# Patient Record
Sex: FEMALE | Race: WHITE | URBAN - METROPOLITAN AREA
[De-identification: names, ages, dates, MRNs, and addresses within clinical notes are randomized per-mention and may not be internally consistent; named-entity substitution may affect disease eponyms.]

---

## 2023-09-27 ENCOUNTER — TELEPHONE (OUTPATIENT)
Dept: ORTHOPEDIC SURGERY | Age: 43
End: 2023-09-27

## 2023-09-27 NOTE — TELEPHONE ENCOUNTER
General Question     Subject: PATIENT CALL AND SHE WOULD LIKE FOR THE OFFICE TO GIVE HER A CALL SHE HAS SOME QUESTION BEFORE SHE COME TO HER APPT THAT IS ON 10/26/23 SHE WILL BE A NEW PATIENT BUT SHE IS [place] FROM [place] AND SHE JUST HAVE SOME QUESTIONS THAT NEEDS TO BE ANSWERED. PLEASE ADVISE.   Patient Eliel [facility] Number: [phone]

## 2023-09-28 NOTE — TELEPHONE ENCOUNTER
OTHER    PT CALLED TO SPEAK WITH CLINICAL IN REGARD TO HAVE GENERAL QUESTIONS TO SPEAK WITH TEAM.     PT IS COMING FROM [place] AND LOOKING TO HAVE QUESTIONS ANSWERED PRIOR TO COMING INTO TOWN.      PLEASE ADVISE

## 2023-09-29 NOTE — TELEPHONE ENCOUNTER
Patient will be called by end of today. Unable to call due to being in busy clinic for the past few days.  Her appt isnt until mid October

## 2023-09-29 NOTE — TELEPHONE ENCOUNTER
General Question     Subject: PT HAS QUESTIONS REGARDING ARTHROFIBROSIS AND IF A CASE LIKE HERS CAN BE TREATED. Patient and /or Facility Request: [facility] Number: [phone]      PLEASE RETURN THE PT'S PHONE CALL. 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 I TOLD HER THAT DR SANTILLAN IS OUT OF THE OFFICE, BUT SHE WOULD STILL LIKE TO SPEAK WITH AN ASSISTANT OR A NURSE. I CALLED THE PT BACK TO LET HER KNOW THE OFFICE DID SEE HER MESSAGE AND WILL BE CALLING HER BACK AT THE END OF THE DAY.

## 2023-10-23 SDOH — HEALTH STABILITY: PHYSICAL HEALTH: ON AVERAGE, HOW MANY MINUTES DO YOU ENGAGE IN EXERCISE AT THIS LEVEL?: 10 MIN

## 2023-10-23 SDOH — HEALTH STABILITY: PHYSICAL HEALTH: ON AVERAGE, HOW MANY DAYS PER WEEK DO YOU ENGAGE IN MODERATE TO STRENUOUS EXERCISE (LIKE A BRISK WALK)?: 0 DAYS

## 2023-10-23 ASSESSMENT — SOCIAL DETERMINANTS OF HEALTH (SDOH)
WITHIN THE LAST YEAR, HAVE YOU BEEN KICKED, HIT, SLAPPED, OR OTHERWISE PHYSICALLY HURT BY YOUR PARTNER OR EX-PARTNER?: NO
WITHIN THE LAST YEAR, HAVE YOU BEEN AFRAID OF YOUR PARTNER OR EX-PARTNER?: NO
WITHIN THE LAST YEAR, HAVE TO BEEN RAPED OR FORCED TO HAVE ANY KIND OF SEXUAL ACTIVITY BY YOUR PARTNER OR EX-PARTNER?: NO
WITHIN THE LAST YEAR, HAVE YOU BEEN HUMILIATED OR EMOTIONALLY ABUSED IN OTHER WAYS BY YOUR PARTNER OR EX-PARTNER?: NO

## 2023-10-26 ENCOUNTER — HOSPITAL ENCOUNTER (OUTPATIENT)
Dept: PHYSICAL THERAPY | Age: 43
Discharge: HOME | End: 2023-10-26

## 2023-10-26 ENCOUNTER — OFFICE VISIT (OUTPATIENT)
Dept: ORTHOPEDIC SURGERY | Age: 43
End: 2023-10-26
Payer: COMMERCIAL

## 2023-10-26 DIAGNOSIS — R29.898 DECREASED STRENGTH OF LOWER EXTREMITY: ICD-10-CM

## 2023-10-26 DIAGNOSIS — M24.661 ARTHROFIBROSIS OF KNEE JOINT, RIGHT: ICD-10-CM

## 2023-10-26 DIAGNOSIS — R26.9 GAIT ABNORMALITY: ICD-10-CM

## 2023-10-26 DIAGNOSIS — M24.661 ARTHROFIBROSIS OF KNEE JOINT, RIGHT: Primary | ICD-10-CM

## 2023-10-26 DIAGNOSIS — M25.561 RIGHT KNEE PAIN, UNSPECIFIED CHRONICITY: Primary | ICD-10-CM

## 2023-10-26 DIAGNOSIS — M25.469 EDEMA OF KNEE: ICD-10-CM

## 2023-10-26 PROCEDURE — 99205 OFFICE O/P NEW HI 60 MIN: CPT | Performed by: ORTHOPAEDIC SURGERY

## 2023-10-26 NOTE — FLOWSHEET NOTE
(68547)    [] Other: Total Timed Code Tx Minutes 50'        Total Treatment Minutes 4:32-5:58  80'        Charge Justification:  (69320) THERAPEUTIC EXERCISE - Provided verbal/tactile cueing for activities related to strengthening, flexibility, endurance, ROM performed to prevent loss of range of motion, maintain or improve muscular strength or increase flexibility, following either an injury or surgery. (02007) NEUROMUSCULAR RE-EDUCATION- Therapeutic procedure, 1 or more areas, each 15 minutes; neuromuscular reeducation of movement, balance, coordination, kinesthetic sense, posture, and/or proprioception for sitting and/or standing activities  (92332) [facility]- Reviewed/Progressed HEP activities related to neuromuscular reeducation of movement, balance, coordination, kinesthetic sense, posture, and/or proprioception for sitting and/or standing activities      TREATMENT PLAN   Plan: POC Initiated today- see adenike for details  Pt lives in [place]. She is scheduled for surgical intervention in January. Pt to return to PT for pre-op assessment. Electronically Signed by Krishan Tabor PT              Date: 10/26/2023     Note: If patient does not return for scheduled/recommended follow up visits, this note will serve as a discharge from care along with the most recent update on progress.

## 2023-10-26 NOTE — PLAN OF CARE
observation. Intake form has been scanned into medical record. Patient has been instructed to contact their primary care physician regarding ROS issues if not already being addressed at this time. [x] Patient history, allergies, meds reviewed. Medical chart reviewed. See intake form.      Co-morbidities/Complexities (which will affect course of rehabilitation):  []None        Arthritic conditions  [x] Rheumatoid arthritis (M05.9)  [x] Osteoarthritis (M19.91)  [] Gout        Neurological conditions  [] Prior Stroke (I69.30)  [] Parkinson's (G20)  [] Encephalopathy (G93.40)  [] Multiple Sclerosis (G35)  [] Post-polio (G14)  [] Spinal cord injury (SCI)  [] Traumatic brain injury (TBI)  [] ALS    Gastrointestinal conditions   [] Constipation (K59.00)  [] Chron's/ulcerative colitis    Endocrine conditions   [] Hypothyroid (E03.9)  [] Hyperthyroid [] Hx of COVID    Musculoskeletal conditions  [] Disc pathology   [] Congenital spine pathologies   [] Osteoporosis (M81.8)  [] Osteopenia (M85.8)  [] Scoliosis    Cardio/Pulmonary conditions  [] Asthma (J45)  [] Coughing   [] COPD (J44.9)  [] CHF  [] A-fib          Rheumatological conditions  [] Fibromyalgia (M79.7)  [] Lupus  [] Sjogrens  [] Ankylosing spondylitis  [] Other autoimmune       Metabolic conditions  [] Morbid obesity (E66.01)  [] Diabetes type 1(E10.65) or 2 (E11.65)   [] Neuropathy (G60.9)        Cardiovascular conditions  [] Hypertension (I10)  [] Hyperlipidemia (E78.5)  [] Angina pectoris (I20)  [] Atherosclerosis (I70)  [] Pacemaker/Defib  [] Hx of CABG/stent/cardiac surgeries        Developmental Disorders  [] Autism (F84.0)  [] Cerebral Palsy (G80)  [] Down Syndrome (Q90.9)  [] Developmental delay     Psychological Disorders  [] Anxiety (F41.9)  [] Depression (F32.9)   [] Bipolar  [] Other:      Prior surgeries  [x] Involved limb  [] Previous spinal surgery  []  section birth  [] Hysterectomy  [] Bowel / bladder surgery  [] Other relevant

## 2023-10-27 NOTE — PROGRESS NOTES
Date:  10/26/2023    Name:  Scott Reese  Address:  [address] [place] [id number]    :  1980      Age:   39 y.o. Chief Complaint    Knee Pain (NP right knee, arthrofibrosis)      History of Present Illness:  Scott Reese is a 39 y.o. female who presents for evaluation of right knee pain due to right knee arthrofibrosis. Patient reports that her problem stems from a right knee meniscus repair in  performed in [place]. She states she began losing range of motion at the time along with cartilage damage so she underwent a right total knee arthroplasty in 2021. Her range of motion continues to decrease where she was only able to achieve 70 degrees at her best.  As she continued to have pain and dysfunction from this she went on to receive a revision total knee arthroplasty in 2022. She is here today to discuss treatment options and to hear about arthrofibrosis program.  Of note, patient reports that she has a history of rheumatoid arthritis and it is believed by her rheumatologist that this is the cause of her arthrofibrosis. She is unsure if she believes she is still in the inflammatory phase given the amount of medications she is on. Patient reports that she is taking Symponi and naltrexone for medication and reports that she is also taking methotrexate in the past.  She takes that prior to her revision total knee arthroplasty and she was ordered to stop taking her methotrexate for 2 weeks prior to surgery and for 6 weeks following the surgery. 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 The patient denies any new injury. The patient denies any catching, giving way, joint locking, numbness, paresthesias, or weakness. No past medical history on file. No past surgical history on file. No family history on file.     Social History
patient in regards to evaluation, preassessment review, treatment plan and education, and documentation during the visit. .     Dr. Ailyn Kingsley also reviewed this patient's history, assisted in obtaining history from the patient, conducting a physical exam, reviewed imaging, provided patient education and further coordinating care. Yesika Francisco MD  8:41 AM  10/27/2023      This dictation was performed with a verbal recognition program [facility]) and it was checked for errors. It is possible that there are still dictated errors within this office note. If so, please bring any errors to my attention for an addendum. All efforts were made to ensure that this office note is accurate.

## 2023-11-07 ENCOUNTER — TELEPHONE (OUTPATIENT)
Dept: ORTHOPEDIC SURGERY | Age: 43
End: 2023-11-07

## 2023-11-07 NOTE — TELEPHONE ENCOUNTER
I was able to speak with the patient's rheumatologist today Dr. Fabián Segura at [phone]. I reviewed the patient's case with him and stated that we are treating her for arthrofibrosis. We are planning on doing surgery after which we sometimes have to use biologic medications to decrease the development of scar tissue. These may range from methotrexate to anakinra. On her current treatment plan Dr. Tereza Taylor believes that it would be okay if we use these medications. The patient recently had a Simponi infusion for her rheumatoid arthritis. We have to make sure that she does not have another infusion immediately prior to surgery. I spoke with the patient and reviewed the conversation I had with her rheumatologist.  All her questions were answered. I was informed by Dr. Carol Ann Rios that a good fax number to send medical records to maintain good communication and quality of care would be at [phone].           Maylin Figueroa PA-C    Physician Assistant - Certified  [id number] [place]    11/07/23 1:21 PM

## 2023-11-20 DIAGNOSIS — M24.661 ARTHROFIBROSIS OF KNEE JOINT, RIGHT: Primary | ICD-10-CM

## 2023-12-21 ENCOUNTER — TELEPHONE (OUTPATIENT)
Dept: ORTHOPEDIC SURGERY | Age: 43
End: 2023-12-21

## 2023-12-27 DIAGNOSIS — M24.661 ARTHROFIBROSIS OF KNEE JOINT, RIGHT: Primary | ICD-10-CM

## 2023-12-28 NOTE — TELEPHONE ENCOUNTER
Auth: [employer]  Date: 01/10/24  Reference # [id number]  Spoke with: Yaima  Type of SX: Outpatient  Location: [facility]  CPT: 71058, [id number], Garret   DX: M24.61  SX area: Rt knee  Insurance: [employer]

## 2024-01-02 RX ORDER — CALCIUM CARBONATE 260MG(650)
TABLET,CHEWABLE ORAL
COMMUNITY

## 2024-01-02 RX ORDER — LIDOCAINE HYDROCHLORIDE 20 MG/ML
SOLUTION OROPHARYNGEAL
COMMUNITY
Start: 2022-11-23 | End: 2024-01-02

## 2024-01-02 RX ORDER — FOLIC ACID 1 MG/1
1 TABLET ORAL DAILY
COMMUNITY
Start: 2021-04-20 | End: 2024-01-02

## 2024-01-02 RX ORDER — IBUPROFEN 800 MG/1
1 TABLET ORAL EVERY 8 HOURS
COMMUNITY
Start: 2022-09-11 | End: 2024-01-02

## 2024-01-02 RX ORDER — ASCORBIC ACID 250 MG
1200 TABLET ORAL DAILY
COMMUNITY

## 2024-01-02 RX ORDER — CHOLECALCIFEROL (VITAMIN D3) 25 MCG
CAPSULE ORAL
COMMUNITY

## 2024-01-02 RX ORDER — FLUTICASONE PROPIONATE 250 UG/1
1 POWDER, METERED RESPIRATORY (INHALATION) 2 TIMES DAILY
COMMUNITY
Start: 2022-08-31 | End: 2024-01-02

## 2024-01-02 RX ORDER — NORTRIPTYLINE HYDROCHLORIDE 25 MG/1
25 CAPSULE ORAL NIGHTLY
COMMUNITY
Start: 2023-06-20

## 2024-01-02 RX ORDER — NAPROXEN SODIUM 220 MG
220 TABLET ORAL EVERY 6 HOURS PRN
COMMUNITY
Start: 2014-09-03 | End: 2024-01-02

## 2024-01-02 RX ORDER — HYDROXYZINE HYDROCHLORIDE 25 MG/1
12.5 TABLET, FILM COATED ORAL DAILY PRN
COMMUNITY
Start: 2023-07-27 | End: 2024-01-02

## 2024-01-02 RX ORDER — MELOXICAM 15 MG/1
15 TABLET ORAL DAILY
COMMUNITY
Start: 2020-02-25 | End: 2024-01-11 | Stop reason: ALTCHOICE

## 2024-01-02 RX ORDER — METHOTREXATE 25 MG/ML
INJECTION INTRA-ARTERIAL; INTRAMUSCULAR; INTRATHECAL; INTRAVENOUS
COMMUNITY
Start: 2021-08-04

## 2024-01-02 RX ORDER — CHOLECALCIFEROL (VITAMIN D3) 1250 MCG
50000 CAPSULE ORAL
COMMUNITY

## 2024-01-04 ENCOUNTER — TELEPHONE (OUTPATIENT)
Dept: ORTHOPEDIC SURGERY | Age: 44
End: 2024-01-04

## 2024-01-04 NOTE — TELEPHONE ENCOUNTER
Other PATIENT WANTS TO KNOW IF SHE CAN TAKE ANY OF HER VITAMINS UP UNTIL SX. PLS CALL TO ADVISE [phone]

## 2024-01-09 ENCOUNTER — HOSPITAL ENCOUNTER (OUTPATIENT)
Dept: PHYSICAL THERAPY | Age: 44
Setting detail: THERAPIES SERIES
Discharge: HOME | End: 2024-01-09
Payer: COMMERCIAL

## 2024-01-09 ENCOUNTER — OFFICE VISIT (OUTPATIENT)
Dept: ORTHOPEDIC SURGERY | Age: 44
End: 2024-01-09

## 2024-01-09 ENCOUNTER — ANESTHESIA EVENT (OUTPATIENT)
Dept: OPERATING ROOM | Age: 44
End: 2024-01-09
Payer: COMMERCIAL

## 2024-01-09 VITALS — WEIGHT: 200 LBS | HEIGHT: 67 IN | BODY MASS INDEX: 31.39 KG/M2

## 2024-01-09 DIAGNOSIS — M25.561 CHRONIC PAIN OF RIGHT KNEE: ICD-10-CM

## 2024-01-09 DIAGNOSIS — G89.29 CHRONIC PAIN OF RIGHT KNEE: ICD-10-CM

## 2024-01-09 DIAGNOSIS — Z01.818 PRE-OP EXAMINATION: Primary | ICD-10-CM

## 2024-01-09 DIAGNOSIS — M24.661 ARTHROFIBROSIS OF KNEE JOINT, RIGHT: ICD-10-CM

## 2024-01-09 PROCEDURE — PREOPEXAM PRE-OP EXAM: Performed by: PHYSICIAN ASSISTANT

## 2024-01-09 PROCEDURE — 97530 THERAPEUTIC ACTIVITIES: CPT | Performed by: PHYSICAL THERAPIST

## 2024-01-09 NOTE — PLAN OF CARE
condition(s) with a corresponding ICD-10 code that is of complexity and severity that require skilled therapeutic intervention. This has a direct and significant impact on the need for therapy and significantly impacts the rate of recovery.     Treatment/Activity Tolerance:  [x] Patient tolerated treatment well [] Patient limited by fatique  [] Patient limited by pain  [] Patient limited by other medical complications  [] Other:     Return to Play: NA    Prognosis for POC: [x] Good [] Fair  [] Poor    Patient requires continued skilled intervention: [x] Yes  [] No      GOALS   1/9/24  Patient stated goal: return to work activities without pain/dysfunction.  Status: [] Progressing: [] Met: [] Not Met: [] Adjusted     Therapist goals for Patient:   Short Term Goals: To be achieved in: 2 weeks  Independent in HEP and progression per patient tolerance, in order to progress toward full function and prevent re-injury.               Status: [] Progressing: [] Met: [] Not Met: [] Adjusted  Patient will have a decrease in pain to 0/10 to help  facilitate improvement in movement, function, and ADLs as indicated by functional deficits.              Status: [] Progressing: [] Met: [] Not Met: [] Adjusted     Long Term Goals: To be achieved in: 6 weeks  Disability index score of 20% or less for the LEFS to assist with return top prior level of function.                  Status: [] Progressing: [] Met: [] Not Met: [] Adjusted  Improve  knee AROM  Flexion and extension to 0-120 degrees or  better to allow for proper joint functioning as indicated by patients functional deficits.  Status: [] Progressing: [] Met: [] Not Met: [] Adjusted  Pt to improve strength to 4+/5 or better of proximal hip, posterior chain LE, quadriceps, and hamstringsto allow for proper muscle and joint use in functional mobility, ADLs and prior level of function  Status: [] Progressing: [] Met: [] Not Met: [] Adjusted  Patient will return to Usual work,

## 2024-01-09 NOTE — PROGRESS NOTES
compliance.  The PO instruction sheet is provided and details on the DVT program and skin preparation pre op explained. The risks explained included but not limited to infection, pain, swelling, woumd healing, blood clots, bleeding, fibrosis, anesthesia, allergies meds, atrophy, and limitation in knee motion, need for additional surgery, alignment problems. The final result cannot be predicted and each patient responds to surgery differently.   The patient verbally expressed an understanding and all questions answered. The patient has major arthritic damage to the knee joint with pain limiting daily activities and significant restrictions and limitations effecting the quality of life.  All conservative measures have been completed and the patient wishes to undergo a right knee arthrotomy with anterior synovectomy, medial and lateral Z-plasty, lysis of adhesions, scar tissue excision and extensor mechanism realignment.    The goal of the surgery in terms of knee flexion is to obtain an additional 15-20 degrees. It was estimated that there was an approximate 50% of the patient's that would be able to achieve their goal which may be difficult given the fact that her arthrofibrosis is in the moderate to severe category.  In addition, the patient has been advised that there is an approximate 25% return of scar tissue and therefore an unsuccessful result.  Our team will certainly be doing everything that we can to maximized the best efforts and we do appreciate the time experience and difficulty encountered by the patient who undergoes this type of treatment program.  In addition we may be utilizing the consultation of Dr. Camryn Rivera, a rheumatologist, that will participate in decisions made for anti-inflammatory medication of the type that is used for rheumatoid arthritis.  This is the standard combined team approach that we have used in our arthrofibrosis center     The patient was once again given our patient

## 2024-01-10 ENCOUNTER — HOSPITAL ENCOUNTER (OUTPATIENT)
Age: 44
Setting detail: OBSERVATION
Discharge: HOME | End: 2024-01-11
Attending: ORTHOPAEDIC SURGERY | Admitting: ORTHOPAEDIC SURGERY
Payer: COMMERCIAL

## 2024-01-10 ENCOUNTER — ANESTHESIA (OUTPATIENT)
Dept: OPERATING ROOM | Age: 44
End: 2024-01-10
Payer: COMMERCIAL

## 2024-01-10 DIAGNOSIS — Z98.890 S/P SURGICAL MANIPULATION OF KNEE JOINT: Primary | ICD-10-CM

## 2024-01-10 DIAGNOSIS — M24.661 ARTHROFIBROSIS OF KNEE JOINT, RIGHT: ICD-10-CM

## 2024-01-10 LAB
ALBUMIN SERPL-MCNC: 3.9 G/DL (ref 3.4–5)
ANION GAP SERPL CALCULATED.3IONS-SCNC: 11 MMOL/L (ref 3–16)
BUN SERPL-MCNC: 7 MG/DL (ref 7–20)
CALCIUM SERPL-MCNC: 9 MG/DL (ref 8.3–10.6)
CHLORIDE SERPL-SCNC: 103 MMOL/L (ref 99–110)
CO2 SERPL-SCNC: 22 MMOL/L (ref 21–32)
CREAT SERPL-MCNC: 0.7 MG/DL (ref 0.6–1.1)
GFR SERPLBLD CREATININE-BSD FMLA CKD-EPI: >60 ML/MIN/{1.73_M2}
GLUCOSE SERPL-MCNC: 158 MG/DL (ref 70–99)
MRSA DNA SPEC QL NAA+PROBE: NORMAL
PHOSPHATE SERPL-MCNC: 2.5 MG/DL (ref 2.5–4.9)
POTASSIUM SERPL-SCNC: 4 MMOL/L (ref 3.5–5.1)
SODIUM SERPL-SCNC: 136 MMOL/L (ref 136–145)

## 2024-01-10 PROCEDURE — 6360000002 HC RX W HCPCS: Performed by: ORTHOPAEDIC SURGERY

## 2024-01-10 PROCEDURE — 2500000003 HC RX 250 WO HCPCS: Performed by: ORTHOPAEDIC SURGERY

## 2024-01-10 PROCEDURE — 2580000003 HC RX 258: Performed by: ANESTHESIOLOGY

## 2024-01-10 PROCEDURE — 3600000004 HC SURGERY LEVEL 4 BASE: Performed by: ORTHOPAEDIC SURGERY

## 2024-01-10 PROCEDURE — 88305 TISSUE EXAM BY PATHOLOGIST: CPT

## 2024-01-10 PROCEDURE — G0378 HOSPITAL OBSERVATION PER HR: HCPCS

## 2024-01-10 PROCEDURE — 2709999900 HC NON-CHARGEABLE SUPPLY: Performed by: ORTHOPAEDIC SURGERY

## 2024-01-10 PROCEDURE — 64447 NJX AA&/STRD FEMORAL NRV IMG: CPT | Performed by: ANESTHESIOLOGY

## 2024-01-10 PROCEDURE — 2580000003 HC RX 258: Performed by: ORTHOPAEDIC SURGERY

## 2024-01-10 PROCEDURE — 2580000003 HC RX 258

## 2024-01-10 PROCEDURE — 87205 SMEAR GRAM STAIN: CPT

## 2024-01-10 PROCEDURE — 3700000000 HC ANESTHESIA ATTENDED CARE: Performed by: ORTHOPAEDIC SURGERY

## 2024-01-10 PROCEDURE — 6370000000 HC RX 637 (ALT 250 FOR IP): Performed by: PHYSICIAN ASSISTANT

## 2024-01-10 PROCEDURE — 80069 RENAL FUNCTION PANEL: CPT

## 2024-01-10 PROCEDURE — 6360000002 HC RX W HCPCS: Performed by: ANESTHESIOLOGY

## 2024-01-10 PROCEDURE — 2500000003 HC RX 250 WO HCPCS

## 2024-01-10 PROCEDURE — 6360000002 HC RX W HCPCS

## 2024-01-10 PROCEDURE — 7100000001 HC PACU RECOVERY - ADDTL 15 MIN: Performed by: ORTHOPAEDIC SURGERY

## 2024-01-10 PROCEDURE — 6360000002 HC RX W HCPCS: Performed by: PHYSICIAN ASSISTANT

## 2024-01-10 PROCEDURE — 87641 MR-STAPH DNA AMP PROBE: CPT

## 2024-01-10 PROCEDURE — 7100000000 HC PACU RECOVERY - FIRST 15 MIN: Performed by: ORTHOPAEDIC SURGERY

## 2024-01-10 PROCEDURE — 2580000003 HC RX 258: Performed by: PHYSICIAN ASSISTANT

## 2024-01-10 PROCEDURE — 87070 CULTURE OTHR SPECIMN AEROBIC: CPT

## 2024-01-10 PROCEDURE — 36415 COLL VENOUS BLD VENIPUNCTURE: CPT

## 2024-01-10 PROCEDURE — 87075 CULTR BACTERIA EXCEPT BLOOD: CPT

## 2024-01-10 PROCEDURE — 3700000001 HC ADD 15 MINUTES (ANESTHESIA): Performed by: ORTHOPAEDIC SURGERY

## 2024-01-10 PROCEDURE — 3600000014 HC SURGERY LEVEL 4 ADDTL 15MIN: Performed by: ORTHOPAEDIC SURGERY

## 2024-01-10 RX ORDER — SODIUM CHLORIDE 9 MG/ML
INJECTION, SOLUTION INTRAVENOUS PRN
Status: DISCONTINUED | OUTPATIENT
Start: 2024-01-10 | End: 2024-01-11 | Stop reason: HOSPADM

## 2024-01-10 RX ORDER — SODIUM CHLORIDE 0.9 % (FLUSH) 0.9 %
5-40 SYRINGE (ML) INJECTION EVERY 12 HOURS SCHEDULED
Status: DISCONTINUED | OUTPATIENT
Start: 2024-01-10 | End: 2024-01-11 | Stop reason: HOSPADM

## 2024-01-10 RX ORDER — TRANEXAMIC ACID 100 MG/ML
INJECTION, SOLUTION INTRAVENOUS PRN
Status: DISCONTINUED | OUTPATIENT
Start: 2024-01-10 | End: 2024-01-10 | Stop reason: HOSPADM

## 2024-01-10 RX ORDER — VANCOMYCIN HYDROCHLORIDE 500 MG/10ML
INJECTION, POWDER, LYOPHILIZED, FOR SOLUTION INTRAVENOUS PRN
Status: DISCONTINUED | OUTPATIENT
Start: 2024-01-10 | End: 2024-01-10 | Stop reason: HOSPADM

## 2024-01-10 RX ORDER — SODIUM CHLORIDE 0.9 % (FLUSH) 0.9 %
5-40 SYRINGE (ML) INJECTION EVERY 12 HOURS SCHEDULED
Status: DISCONTINUED | OUTPATIENT
Start: 2024-01-10 | End: 2024-01-10 | Stop reason: HOSPADM

## 2024-01-10 RX ORDER — SODIUM CHLORIDE 9 MG/ML
INJECTION, SOLUTION INTRAVENOUS PRN
Status: DISCONTINUED | OUTPATIENT
Start: 2024-01-10 | End: 2024-01-10 | Stop reason: HOSPADM

## 2024-01-10 RX ORDER — HYDROMORPHONE HYDROCHLORIDE 2 MG/ML
INJECTION, SOLUTION INTRAMUSCULAR; INTRAVENOUS; SUBCUTANEOUS PRN
Status: DISCONTINUED | OUTPATIENT
Start: 2024-01-10 | End: 2024-01-10 | Stop reason: SDUPTHER

## 2024-01-10 RX ORDER — MEPERIDINE HYDROCHLORIDE 25 MG/ML
12.5 INJECTION INTRAMUSCULAR; INTRAVENOUS; SUBCUTANEOUS EVERY 5 MIN PRN
Status: DISCONTINUED | OUTPATIENT
Start: 2024-01-10 | End: 2024-01-10 | Stop reason: HOSPADM

## 2024-01-10 RX ORDER — GABAPENTIN 100 MG/1
100 CAPSULE ORAL 3 TIMES DAILY
Status: DISCONTINUED | OUTPATIENT
Start: 2024-01-10 | End: 2024-01-11 | Stop reason: HOSPADM

## 2024-01-10 RX ORDER — GABAPENTIN 100 MG/1
100 CAPSULE ORAL 3 TIMES DAILY
Status: CANCELLED | OUTPATIENT
Start: 2024-01-10

## 2024-01-10 RX ORDER — FENTANYL CITRATE 50 UG/ML
25 INJECTION, SOLUTION INTRAMUSCULAR; INTRAVENOUS EVERY 5 MIN PRN
Status: DISCONTINUED | OUTPATIENT
Start: 2024-01-10 | End: 2024-01-10 | Stop reason: HOSPADM

## 2024-01-10 RX ORDER — FENTANYL CITRATE 50 UG/ML
INJECTION, SOLUTION INTRAMUSCULAR; INTRAVENOUS PRN
Status: DISCONTINUED | OUTPATIENT
Start: 2024-01-10 | End: 2024-01-10 | Stop reason: SDUPTHER

## 2024-01-10 RX ORDER — ASPIRIN 81 MG/1
81 TABLET ORAL 2 TIMES DAILY
Status: CANCELLED | OUTPATIENT
Start: 2024-01-10

## 2024-01-10 RX ORDER — FAMOTIDINE 20 MG/1
20 TABLET, FILM COATED ORAL 2 TIMES DAILY
Status: DISCONTINUED | OUTPATIENT
Start: 2024-01-10 | End: 2024-01-11 | Stop reason: HOSPADM

## 2024-01-10 RX ORDER — IPRATROPIUM BROMIDE AND ALBUTEROL SULFATE 2.5; .5 MG/3ML; MG/3ML
1 SOLUTION RESPIRATORY (INHALATION)
Status: DISCONTINUED | OUTPATIENT
Start: 2024-01-10 | End: 2024-01-10 | Stop reason: HOSPADM

## 2024-01-10 RX ORDER — MIDAZOLAM HYDROCHLORIDE 1 MG/ML
INJECTION INTRAMUSCULAR; INTRAVENOUS PRN
Status: DISCONTINUED | OUTPATIENT
Start: 2024-01-10 | End: 2024-01-10 | Stop reason: SDUPTHER

## 2024-01-10 RX ORDER — PROCHLORPERAZINE EDISYLATE 5 MG/ML
5 INJECTION INTRAMUSCULAR; INTRAVENOUS
Status: COMPLETED | OUTPATIENT
Start: 2024-01-10 | End: 2024-01-10

## 2024-01-10 RX ORDER — LORAZEPAM 2 MG/ML
0.5 INJECTION INTRAMUSCULAR
Status: DISCONTINUED | OUTPATIENT
Start: 2024-01-10 | End: 2024-01-10 | Stop reason: HOSPADM

## 2024-01-10 RX ORDER — FAMOTIDINE 10 MG/ML
INJECTION, SOLUTION INTRAVENOUS PRN
Status: DISCONTINUED | OUTPATIENT
Start: 2024-01-10 | End: 2024-01-10 | Stop reason: SDUPTHER

## 2024-01-10 RX ORDER — DROPERIDOL 2.5 MG/ML
0.62 INJECTION, SOLUTION INTRAMUSCULAR; INTRAVENOUS ONCE
Status: COMPLETED | OUTPATIENT
Start: 2024-01-10 | End: 2024-01-10

## 2024-01-10 RX ORDER — HYDROMORPHONE HYDROCHLORIDE 1 MG/ML
0.5 INJECTION, SOLUTION INTRAMUSCULAR; INTRAVENOUS; SUBCUTANEOUS EVERY 5 MIN PRN
Status: DISCONTINUED | OUTPATIENT
Start: 2024-01-10 | End: 2024-01-10 | Stop reason: HOSPADM

## 2024-01-10 RX ORDER — LIDOCAINE HYDROCHLORIDE 20 MG/ML
INJECTION, SOLUTION INTRAVENOUS PRN
Status: DISCONTINUED | OUTPATIENT
Start: 2024-01-10 | End: 2024-01-10 | Stop reason: SDUPTHER

## 2024-01-10 RX ORDER — LIDOCAINE HYDROCHLORIDE 10 MG/ML
1 INJECTION, SOLUTION EPIDURAL; INFILTRATION; INTRACAUDAL; PERINEURAL
Status: DISCONTINUED | OUTPATIENT
Start: 2024-01-10 | End: 2024-01-10 | Stop reason: HOSPADM

## 2024-01-10 RX ORDER — SODIUM CHLORIDE, SODIUM LACTATE, POTASSIUM CHLORIDE, CALCIUM CHLORIDE 600; 310; 30; 20 MG/100ML; MG/100ML; MG/100ML; MG/100ML
INJECTION, SOLUTION INTRAVENOUS CONTINUOUS
Status: DISCONTINUED | OUTPATIENT
Start: 2024-01-10 | End: 2024-01-10 | Stop reason: HOSPADM

## 2024-01-10 RX ORDER — SODIUM CHLORIDE 0.9 % (FLUSH) 0.9 %
5-40 SYRINGE (ML) INJECTION PRN
Status: DISCONTINUED | OUTPATIENT
Start: 2024-01-10 | End: 2024-01-11 | Stop reason: HOSPADM

## 2024-01-10 RX ORDER — ACETAMINOPHEN 325 MG/1
650 TABLET ORAL EVERY 6 HOURS
Status: DISCONTINUED | OUTPATIENT
Start: 2024-01-10 | End: 2024-01-11 | Stop reason: HOSPADM

## 2024-01-10 RX ORDER — SODIUM CHLORIDE, SODIUM LACTATE, POTASSIUM CHLORIDE, CALCIUM CHLORIDE 600; 310; 30; 20 MG/100ML; MG/100ML; MG/100ML; MG/100ML
INJECTION, SOLUTION INTRAVENOUS CONTINUOUS
Status: DISCONTINUED | OUTPATIENT
Start: 2024-01-10 | End: 2024-01-11 | Stop reason: HOSPADM

## 2024-01-10 RX ORDER — SENNOSIDES A AND B 8.6 MG/1
1 TABLET, FILM COATED ORAL DAILY PRN
Status: DISCONTINUED | OUTPATIENT
Start: 2024-01-10 | End: 2024-01-11 | Stop reason: HOSPADM

## 2024-01-10 RX ORDER — KETAMINE HCL IN NACL, ISO-OSM 20 MG/2 ML
SYRINGE (ML) INJECTION PRN
Status: DISCONTINUED | OUTPATIENT
Start: 2024-01-10 | End: 2024-01-10 | Stop reason: SDUPTHER

## 2024-01-10 RX ORDER — ROCURONIUM BROMIDE 10 MG/ML
INJECTION, SOLUTION INTRAVENOUS PRN
Status: DISCONTINUED | OUTPATIENT
Start: 2024-01-10 | End: 2024-01-10 | Stop reason: SDUPTHER

## 2024-01-10 RX ORDER — MIDAZOLAM HYDROCHLORIDE 1 MG/ML
INJECTION INTRAMUSCULAR; INTRAVENOUS
Status: COMPLETED | OUTPATIENT
Start: 2024-01-10 | End: 2024-01-10

## 2024-01-10 RX ORDER — PROPOFOL 10 MG/ML
INJECTION, EMULSION INTRAVENOUS PRN
Status: DISCONTINUED | OUTPATIENT
Start: 2024-01-10 | End: 2024-01-10 | Stop reason: SDUPTHER

## 2024-01-10 RX ORDER — SODIUM CHLORIDE 0.9 % (FLUSH) 0.9 %
5-40 SYRINGE (ML) INJECTION PRN
Status: DISCONTINUED | OUTPATIENT
Start: 2024-01-10 | End: 2024-01-10 | Stop reason: HOSPADM

## 2024-01-10 RX ORDER — DIPHENHYDRAMINE HYDROCHLORIDE 50 MG/ML
12.5 INJECTION INTRAMUSCULAR; INTRAVENOUS
Status: DISCONTINUED | OUTPATIENT
Start: 2024-01-10 | End: 2024-01-10 | Stop reason: HOSPADM

## 2024-01-10 RX ORDER — TRAMADOL HYDROCHLORIDE 50 MG/1
50 TABLET ORAL EVERY 6 HOURS PRN
Status: DISCONTINUED | OUTPATIENT
Start: 2024-01-10 | End: 2024-01-11 | Stop reason: HOSPADM

## 2024-01-10 RX ORDER — PROMETHAZINE HYDROCHLORIDE 12.5 MG/1
12.5 TABLET ORAL EVERY 6 HOURS PRN
Status: DISCONTINUED | OUTPATIENT
Start: 2024-01-10 | End: 2024-01-11 | Stop reason: HOSPADM

## 2024-01-10 RX ORDER — MIDAZOLAM HYDROCHLORIDE 1 MG/ML
INJECTION INTRAMUSCULAR; INTRAVENOUS
Status: COMPLETED
Start: 2024-01-10 | End: 2024-01-10

## 2024-01-10 RX ORDER — BUPIVACAINE HYDROCHLORIDE 2.5 MG/ML
INJECTION, SOLUTION EPIDURAL; INFILTRATION; INTRACAUDAL
Status: DISPENSED
Start: 2024-01-10 | End: 2024-01-11

## 2024-01-10 RX ORDER — ACETAMINOPHEN 325 MG/1
650 TABLET ORAL EVERY 4 HOURS PRN
Status: CANCELLED | OUTPATIENT
Start: 2024-01-10

## 2024-01-10 RX ORDER — METHOCARBAMOL 100 MG/ML
INJECTION, SOLUTION INTRAMUSCULAR; INTRAVENOUS PRN
Status: DISCONTINUED | OUTPATIENT
Start: 2024-01-10 | End: 2024-01-10 | Stop reason: SDUPTHER

## 2024-01-10 RX ORDER — MORPHINE SULFATE 2 MG/ML
2 INJECTION, SOLUTION INTRAMUSCULAR; INTRAVENOUS
Status: DISCONTINUED | OUTPATIENT
Start: 2024-01-10 | End: 2024-01-11 | Stop reason: HOSPADM

## 2024-01-10 RX ORDER — ASPIRIN 325 MG
325 TABLET, DELAYED RELEASE (ENTERIC COATED) ORAL 2 TIMES DAILY
Status: DISCONTINUED | OUTPATIENT
Start: 2024-01-10 | End: 2024-01-11 | Stop reason: HOSPADM

## 2024-01-10 RX ORDER — MORPHINE SULFATE 2 MG/ML
4 INJECTION, SOLUTION INTRAMUSCULAR; INTRAVENOUS
Status: DISCONTINUED | OUTPATIENT
Start: 2024-01-10 | End: 2024-01-11 | Stop reason: HOSPADM

## 2024-01-10 RX ORDER — BUPIVACAINE HYDROCHLORIDE 2.5 MG/ML
INJECTION, SOLUTION INFILTRATION; PERINEURAL
Status: COMPLETED | OUTPATIENT
Start: 2024-01-10 | End: 2024-01-10

## 2024-01-10 RX ORDER — LABETALOL HYDROCHLORIDE 5 MG/ML
10 INJECTION, SOLUTION INTRAVENOUS
Status: DISCONTINUED | OUTPATIENT
Start: 2024-01-10 | End: 2024-01-10 | Stop reason: HOSPADM

## 2024-01-10 RX ADMIN — HYDROMORPHONE HYDROCHLORIDE 0.5 MG: 2 INJECTION, SOLUTION INTRAMUSCULAR; INTRAVENOUS; SUBCUTANEOUS at 14:48

## 2024-01-10 RX ADMIN — Medication 20 MG: at 14:43

## 2024-01-10 RX ADMIN — ROCURONIUM BROMIDE 50 MG: 10 INJECTION, SOLUTION INTRAVENOUS at 14:20

## 2024-01-10 RX ADMIN — TRAMADOL HYDROCHLORIDE 50 MG: 50 TABLET, COATED ORAL at 22:21

## 2024-01-10 RX ADMIN — ROCURONIUM BROMIDE 40 MG: 10 INJECTION, SOLUTION INTRAVENOUS at 15:17

## 2024-01-10 RX ADMIN — SODIUM CHLORIDE 2000 MG: 900 INJECTION INTRAVENOUS at 14:25

## 2024-01-10 RX ADMIN — PROPOFOL 20 MG: 10 INJECTION, EMULSION INTRAVENOUS at 16:09

## 2024-01-10 RX ADMIN — MIDAZOLAM HYDROCHLORIDE 2 MG: 2 INJECTION, SOLUTION INTRAMUSCULAR; INTRAVENOUS at 12:45

## 2024-01-10 RX ADMIN — ASPIRIN 325 MG: 325 TABLET, COATED ORAL at 22:21

## 2024-01-10 RX ADMIN — HYDROMORPHONE HYDROCHLORIDE 0.5 MG: 2 INJECTION, SOLUTION INTRAMUSCULAR; INTRAVENOUS; SUBCUTANEOUS at 15:30

## 2024-01-10 RX ADMIN — BUPIVACAINE HYDROCHLORIDE 20 ML: 2.5 INJECTION, SOLUTION INFILTRATION; PERINEURAL at 12:45

## 2024-01-10 RX ADMIN — MIDAZOLAM HYDROCHLORIDE 2 MG: 1 INJECTION INTRAMUSCULAR; INTRAVENOUS at 14:14

## 2024-01-10 RX ADMIN — SUGAMMADEX 200 MG: 100 INJECTION, SOLUTION INTRAVENOUS at 16:01

## 2024-01-10 RX ADMIN — SODIUM CHLORIDE, PRESERVATIVE FREE 10 ML: 5 INJECTION INTRAVENOUS at 22:22

## 2024-01-10 RX ADMIN — SODIUM CHLORIDE, POTASSIUM CHLORIDE, SODIUM LACTATE AND CALCIUM CHLORIDE: 600; 310; 30; 20 INJECTION, SOLUTION INTRAVENOUS at 16:03

## 2024-01-10 RX ADMIN — GABAPENTIN 100 MG: 100 CAPSULE ORAL at 22:21

## 2024-01-10 RX ADMIN — DROPERIDOL 0.62 MG: 2.5 INJECTION, SOLUTION INTRAMUSCULAR; INTRAVENOUS at 20:35

## 2024-01-10 RX ADMIN — PROPOFOL 200 MG: 10 INJECTION, EMULSION INTRAVENOUS at 14:20

## 2024-01-10 RX ADMIN — METHOCARBAMOL 500 MG: 100 INJECTION, SOLUTION INTRAMUSCULAR; INTRAVENOUS at 14:48

## 2024-01-10 RX ADMIN — SODIUM CHLORIDE 2000 MG: 900 INJECTION INTRAVENOUS at 22:31

## 2024-01-10 RX ADMIN — METHOCARBAMOL 500 MG: 100 INJECTION, SOLUTION INTRAMUSCULAR; INTRAVENOUS at 14:58

## 2024-01-10 RX ADMIN — LIDOCAINE HYDROCHLORIDE 80 MG: 20 INJECTION, SOLUTION INTRAVENOUS at 14:20

## 2024-01-10 RX ADMIN — GABAPENTIN 100 MG: 100 CAPSULE ORAL at 18:11

## 2024-01-10 RX ADMIN — SODIUM CHLORIDE, POTASSIUM CHLORIDE, SODIUM LACTATE AND CALCIUM CHLORIDE: 600; 310; 30; 20 INJECTION, SOLUTION INTRAVENOUS at 14:42

## 2024-01-10 RX ADMIN — SODIUM CHLORIDE, POTASSIUM CHLORIDE, SODIUM LACTATE AND CALCIUM CHLORIDE: 600; 310; 30; 20 INJECTION, SOLUTION INTRAVENOUS at 22:24

## 2024-01-10 RX ADMIN — FAMOTIDINE 20 MG: 20 TABLET, FILM COATED ORAL at 22:31

## 2024-01-10 RX ADMIN — DEXMEDETOMIDINE HYDROCHLORIDE 8 MCG: 100 INJECTION, SOLUTION INTRAVENOUS at 16:09

## 2024-01-10 RX ADMIN — HYDROMORPHONE HYDROCHLORIDE 0.5 MG: 2 INJECTION, SOLUTION INTRAMUSCULAR; INTRAVENOUS; SUBCUTANEOUS at 15:06

## 2024-01-10 RX ADMIN — SODIUM CHLORIDE, POTASSIUM CHLORIDE, SODIUM LACTATE AND CALCIUM CHLORIDE: 600; 310; 30; 20 INJECTION, SOLUTION INTRAVENOUS at 12:29

## 2024-01-10 RX ADMIN — FAMOTIDINE 20 MG: 10 INJECTION, SOLUTION INTRAVENOUS at 14:20

## 2024-01-10 RX ADMIN — HYDROMORPHONE HYDROCHLORIDE 0.5 MG: 2 INJECTION, SOLUTION INTRAMUSCULAR; INTRAVENOUS; SUBCUTANEOUS at 15:16

## 2024-01-10 RX ADMIN — HYDROMORPHONE HYDROCHLORIDE 0.5 MG: 1 INJECTION, SOLUTION INTRAMUSCULAR; INTRAVENOUS; SUBCUTANEOUS at 17:24

## 2024-01-10 RX ADMIN — TRANEXAMIC ACID 1000 MG: 100 INJECTION, SOLUTION INTRAVENOUS at 15:30

## 2024-01-10 RX ADMIN — TRAMADOL HYDROCHLORIDE 50 MG: 50 TABLET, COATED ORAL at 17:20

## 2024-01-10 RX ADMIN — FENTANYL CITRATE 25 MCG: 50 INJECTION INTRAMUSCULAR; INTRAVENOUS at 16:57

## 2024-01-10 RX ADMIN — FENTANYL CITRATE 100 MCG: 50 INJECTION, SOLUTION INTRAMUSCULAR; INTRAVENOUS at 14:20

## 2024-01-10 RX ADMIN — PROCHLORPERAZINE EDISYLATE 5 MG: 5 INJECTION INTRAMUSCULAR; INTRAVENOUS at 17:31

## 2024-01-10 RX ADMIN — ACETAMINOPHEN 650 MG: 325 TABLET ORAL at 22:21

## 2024-01-10 ASSESSMENT — PAIN SCALES - GENERAL
PAINLEVEL_OUTOF10: 6
PAINLEVEL_OUTOF10: 4
PAINLEVEL_OUTOF10: 4
PAINLEVEL_OUTOF10: 7
PAINLEVEL_OUTOF10: 4
PAINLEVEL_OUTOF10: 5
PAINLEVEL_OUTOF10: 2
PAINLEVEL_OUTOF10: 4
PAINLEVEL_OUTOF10: 7

## 2024-01-10 ASSESSMENT — PAIN DESCRIPTION - ORIENTATION
ORIENTATION: RIGHT
ORIENTATION: RIGHT;UPPER
ORIENTATION: RIGHT

## 2024-01-10 ASSESSMENT — PAIN DESCRIPTION - PAIN TYPE
TYPE: SURGICAL PAIN
TYPE: CHRONIC PAIN;ACUTE PAIN
TYPE: SURGICAL PAIN
TYPE: SURGICAL PAIN

## 2024-01-10 ASSESSMENT — PAIN DESCRIPTION - DESCRIPTORS
DESCRIPTORS: ACHING
DESCRIPTORS: DISCOMFORT
DESCRIPTORS: ACHING;BURNING
DESCRIPTORS: ACHING

## 2024-01-10 ASSESSMENT — PAIN - FUNCTIONAL ASSESSMENT
PAIN_FUNCTIONAL_ASSESSMENT: ACTIVITIES ARE NOT PREVENTED
PAIN_FUNCTIONAL_ASSESSMENT: FACE, LEGS, ACTIVITY, CRY, AND CONSOLABILITY (FLACC)
PAIN_FUNCTIONAL_ASSESSMENT: PREVENTS OR INTERFERES SOME ACTIVE ACTIVITIES AND ADLS
PAIN_FUNCTIONAL_ASSESSMENT: ACTIVITIES ARE NOT PREVENTED
PAIN_FUNCTIONAL_ASSESSMENT: PREVENTS OR INTERFERES SOME ACTIVE ACTIVITIES AND ADLS

## 2024-01-10 ASSESSMENT — PAIN DESCRIPTION - LOCATION
LOCATION: KNEE
LOCATION: LEG
LOCATION: KNEE

## 2024-01-10 ASSESSMENT — PAIN DESCRIPTION - FREQUENCY
FREQUENCY: CONTINUOUS
FREQUENCY: INTERMITTENT

## 2024-01-10 ASSESSMENT — PAIN DESCRIPTION - ONSET: ONSET: ON-GOING

## 2024-01-10 ASSESSMENT — LIFESTYLE VARIABLES: SMOKING_STATUS: 0

## 2024-01-10 ASSESSMENT — PAIN DESCRIPTION - DIRECTION: RADIATING_TOWARDS: NO

## 2024-01-10 NOTE — H&P
Updated History and Physical    I have reviewed the history and physical from 12/11/23 and examined the patient and find no relevant changes.    I have reviewed with the patient and/or family the risks, benefits, and alternatives to the procedure.    Alfredo Chang,   1/10/2024

## 2024-01-10 NOTE — ANESTHESIA POSTPROCEDURE EVALUATION
Department of Anesthesiology  Postprocedure Note    Patient: Kris Elizalde  MRN: [id number]  YOB: 1980  Date of evaluation: 1/10/2024    Procedure Summary       Date: 01/10/24 Room / Location: [address]    Anesthesia Start: 1414 Anesthesia Stop: 1626    Procedure: RIGHT KNEE ANTERIOR ARTHROTOMY WITH ANTERIOR SYNOVECTOMY AND RECONSTRUCTION WITH EXTENSOR REALIGMENT REALIGNMENT WITH MANIPULATION UNDER ANESTHESIA (Right: Knee) Diagnosis:       Arthrofibrosis of knee joint, right      (Arthrofibrosis of knee joint, right [M24.661])    Surgeons: Pablo Shipley MD Responsible Provider: Jake Hodge MD    Anesthesia Type: general, regional ASA Status: 2            Anesthesia Type: No value filed.    Elpidio Phase I: Elpidio Score: 10    Elpidio Phase II:      Anesthesia Post Evaluation    Patient location during evaluation: PACU  Patient participation: complete - patient participated  Level of consciousness: sleepy but conscious  Pain score: 0  Airway patency: patent  Nausea & Vomiting: no nausea  Cardiovascular status: hemodynamically stable  Respiratory status: face mask  Hydration status: euvolemic  Multimodal analgesia pain management approach  Pain management: adequate        No notable events documented.

## 2024-01-10 NOTE — ANESTHESIA PRE PROCEDURE
Department of Anesthesiology  Preprocedure Note       Name:  Kris Elizalde   Age:  43 y.o.  :  1980                                          MRN:  [id number]         Date:  1/10/2024      Surgeon: Surgeon(s):  Pablo Shipley MD    Procedure: Procedure(s):  RIGHT KNEE ANTERIOR ARTHROTOMY WITH SYNOVECTOMY AND RECONSTRUCTION WITH EXTENSOR REALIGMENT AND/OR MUSCLE ADVANCEMENT WITH MANIPULATION UNDER ANESTHESIA  .    Medications prior to admission:   Prior to Admission medications    Medication Sig Start Date End Date Taking? Authorizing Provider   Magnesium Oxide (MAGNESIUM-OXIDE) 250 MG TABS tablet Take by mouth 20  Yes Yunior Liang MD   meloxicam (MOBIC) 15 MG tablet Take 1 tablet by mouth daily 20  Yes Yunior Liang MD   Methotrexate Sodium, PF, 50 MG/2ML SOLN chemo injection INJECT 0.6ML ONCE A WEEK.. SINGLE USE VIAL. DISCARD REMAINING AFTER INJECTION. 21  Yes Yunior Liang MD   Naltrexone HCl, Pain, 1.5 MG CAPS  23  Yes Yunior Liang MD   nortriptyline (PAMELOR) 25 MG capsule Take 1 capsule by mouth nightly 23  Yes Yunior Liang MD   golimumab (SIMPONI ARIA) 50 MG/4ML SOLN  23  Yes Yunior Liang MD   ascorbic acid (V-R VITAMIN C) 250 MG tablet Take 1,200 mg by mouth daily    Yunior Liang MD   Cholecalciferol (VITAMIN D3) 1.25 MG (89774 UT) CAPS Take 1 capsule by mouth every 7 days    Yunior Liang MD   Cholecalciferol (VITAMIN D-3) 25 MCG (1000 UT) CAPS Vitamin D-3    Yunior Liang MD   Zinc 10 MG LOZG Zinc    Yunior Liang MD   VITAMIN E PO Take 7 mg by mouth daily    Yunior Liang MD       Current medications:    No current facility-administered medications for this encounter.       Allergies:    Allergies   Allergen Reactions   • Ondansetron Itching     Hives.   • Oxychlorodene Nausea And Vomiting   • Oxycodone-Acetaminophen Nausea And Vomiting and Other (See Comments)       Problem List:

## 2024-01-10 NOTE — ANESTHESIA PROCEDURE NOTES
Peripheral Block    Patient location during procedure: pre-op  Reason for block: post-op pain management and at surgeon's request  Start time: 1/10/2024 12:45 PM  End time: 1/10/2024 12:53 PM  Staffing  Performed: anesthesiologist   Anesthesiologist: Portillo Britt DO  Performed by: Portillo Britt DO  Authorized by: Portillo Britt DO    Preanesthetic Checklist  Completed: patient identified, IV checked, site marked, risks and benefits discussed, surgical/procedural consents, equipment checked, pre-op evaluation, timeout performed, anesthesia consent given, oxygen available, monitors applied/VS acknowledged, fire risk safety assessment completed and verbalized and blood product R/B/A discussed and consented  Peripheral Block   Patient position: supine  Prep: ChloraPrep  Provider prep: mask and sterile gloves  Patient monitoring: continuous pulse ox, cardiac monitor, continuous capnometry, IV access, oxygen and responsive to questions  Block type: Femoral  Adductor canal  Laterality: right  Injection technique: single-shot  Guidance: ultrasound guided  Local infiltration: bupivacaine  Local infiltration: bupivacaine    Needle   Needle type: insulated echogenic nerve stimulator needle   Needle gauge: 20 G  Needle localization: ultrasound guidance  Needle length: 10 cm  Assessment   Injection assessment: negative aspiration for heme, no paresthesia on injection, local visualized surrounding nerve on ultrasound and no intravascular symptoms  Paresthesia pain: none  Slow fractionated injection: yes  Hemodynamics: stable  Real-time US image taken/store: yes  Outcomes: uncomplicated and patient tolerated procedure well    Additional Notes  Femoral artery and vein identified with Ultrasound and the adductor canal plane was identified. Once identified, local anesthetic was injected into the plane with good spread.   Medications Administered  midazolam (VERSED) injection 2 mg/2mL - IntraVENous   2 mg -

## 2024-01-11 ENCOUNTER — HOSPITAL ENCOUNTER (OUTPATIENT)
Dept: PHYSICAL THERAPY | Age: 44
Setting detail: THERAPIES SERIES
Discharge: HOME | End: 2024-01-11
Payer: COMMERCIAL

## 2024-01-11 ENCOUNTER — APPOINTMENT (OUTPATIENT)
Dept: PHYSICAL THERAPY | Age: 44
End: 2024-01-11
Payer: COMMERCIAL

## 2024-01-11 VITALS
OXYGEN SATURATION: 98 % | HEIGHT: 66 IN | RESPIRATION RATE: 16 BRPM | WEIGHT: 197.6 LBS | TEMPERATURE: 97.8 F | BODY MASS INDEX: 31.76 KG/M2 | HEART RATE: 67 BPM | DIASTOLIC BLOOD PRESSURE: 94 MMHG | SYSTOLIC BLOOD PRESSURE: 149 MMHG

## 2024-01-11 DIAGNOSIS — M24.661 ARTHROFIBROSIS OF KNEE JOINT, RIGHT: Primary | ICD-10-CM

## 2024-01-11 LAB
HCT VFR BLD AUTO: 31.4 % (ref 36–48)
HGB BLD-MCNC: 10.7 G/DL (ref 12–16)

## 2024-01-11 PROCEDURE — 97110 THERAPEUTIC EXERCISES: CPT | Performed by: PHYSICAL THERAPIST

## 2024-01-11 PROCEDURE — 85014 HEMATOCRIT: CPT

## 2024-01-11 PROCEDURE — 97530 THERAPEUTIC ACTIVITIES: CPT

## 2024-01-11 PROCEDURE — 85018 HEMOGLOBIN: CPT

## 2024-01-11 PROCEDURE — 97535 SELF CARE MNGMENT TRAINING: CPT

## 2024-01-11 PROCEDURE — 94799 UNLISTED PULMONARY SVC/PX: CPT

## 2024-01-11 PROCEDURE — G0378 HOSPITAL OBSERVATION PER HR: HCPCS

## 2024-01-11 PROCEDURE — 97112 NEUROMUSCULAR REEDUCATION: CPT | Performed by: PHYSICAL THERAPIST

## 2024-01-11 PROCEDURE — 97116 GAIT TRAINING THERAPY: CPT

## 2024-01-11 PROCEDURE — 97140 MANUAL THERAPY 1/> REGIONS: CPT | Performed by: PHYSICAL THERAPIST

## 2024-01-11 PROCEDURE — 94150 VITAL CAPACITY TEST: CPT

## 2024-01-11 PROCEDURE — 97166 OT EVAL MOD COMPLEX 45 MIN: CPT

## 2024-01-11 PROCEDURE — 6370000000 HC RX 637 (ALT 250 FOR IP): Performed by: PHYSICIAN ASSISTANT

## 2024-01-11 PROCEDURE — 97162 PT EVAL MOD COMPLEX 30 MIN: CPT

## 2024-01-11 PROCEDURE — 2580000003 HC RX 258: Performed by: PHYSICIAN ASSISTANT

## 2024-01-11 PROCEDURE — 6360000002 HC RX W HCPCS: Performed by: PHYSICIAN ASSISTANT

## 2024-01-11 PROCEDURE — 36415 COLL VENOUS BLD VENIPUNCTURE: CPT

## 2024-01-11 PROCEDURE — 97016 VASOPNEUMATIC DEVICE THERAPY: CPT | Performed by: PHYSICAL THERAPIST

## 2024-01-11 RX ORDER — GABAPENTIN 100 MG/1
100 CAPSULE ORAL 3 TIMES DAILY
Qty: 40 CAPSULE | Refills: 1 | Status: SHIPPED | OUTPATIENT
Start: 2024-01-11 | End: 2024-01-11

## 2024-01-11 RX ORDER — PROMETHAZINE HYDROCHLORIDE 12.5 MG/1
12.5 TABLET ORAL 4 TIMES DAILY PRN
Qty: 20 TABLET | Refills: 0 | Status: SHIPPED | OUTPATIENT
Start: 2024-01-11 | End: 2024-01-18

## 2024-01-11 RX ORDER — TRAMADOL HYDROCHLORIDE 50 MG/1
50 TABLET ORAL EVERY 6 HOURS PRN
Qty: 28 TABLET | Refills: 0
Start: 2024-01-11 | End: 2024-01-11 | Stop reason: HOSPADM

## 2024-01-11 RX ORDER — GABAPENTIN 100 MG/1
100 CAPSULE ORAL 3 TIMES DAILY
Qty: 42 CAPSULE | Refills: 0 | Status: SHIPPED | OUTPATIENT
Start: 2024-01-11 | End: 2024-01-25

## 2024-01-11 RX ORDER — CELECOXIB 200 MG/1
200 CAPSULE ORAL DAILY
Qty: 14 CAPSULE | Refills: 0 | Status: SHIPPED | OUTPATIENT
Start: 2024-01-11 | End: 2024-01-25

## 2024-01-11 RX ORDER — GABAPENTIN 100 MG/1
100 CAPSULE ORAL 3 TIMES DAILY
Qty: 42 CAPSULE | Refills: 0 | Status: SHIPPED | OUTPATIENT
Start: 2024-01-11 | End: 2024-01-11

## 2024-01-11 RX ORDER — ASPIRIN 325 MG
325 TABLET, DELAYED RELEASE (ENTERIC COATED) ORAL 2 TIMES DAILY
Qty: 30 TABLET | Refills: 3
Start: 2024-01-11

## 2024-01-11 RX ORDER — SENNOSIDES A AND B 8.6 MG/1
1 TABLET, FILM COATED ORAL DAILY PRN
Qty: 14 TABLET | Refills: 0 | Status: SHIPPED | OUTPATIENT
Start: 2024-01-11 | End: 2024-01-11

## 2024-01-11 RX ORDER — ACETAMINOPHEN 325 MG/1
650 TABLET ORAL EVERY 6 HOURS
Qty: 120 TABLET | Refills: 3 | Status: SHIPPED | OUTPATIENT
Start: 2024-01-11

## 2024-01-11 RX ORDER — PROMETHAZINE HYDROCHLORIDE 12.5 MG/1
12.5 TABLET ORAL EVERY 6 HOURS PRN
Qty: 14 TABLET | Refills: 0 | Status: SHIPPED | OUTPATIENT
Start: 2024-01-11 | End: 2024-01-11

## 2024-01-11 RX ORDER — ASPIRIN 325 MG
325 TABLET ORAL 2 TIMES DAILY
Qty: 28 TABLET | Refills: 0 | Status: SHIPPED | OUTPATIENT
Start: 2024-01-11 | End: 2024-01-25

## 2024-01-11 RX ORDER — GABAPENTIN 100 MG/1
100 CAPSULE ORAL 3 TIMES DAILY
Qty: 42 CAPSULE | Refills: 0
Start: 2024-01-11 | End: 2024-01-11 | Stop reason: HOSPADM

## 2024-01-11 RX ORDER — TRAMADOL HYDROCHLORIDE 50 MG/1
50 TABLET ORAL EVERY 6 HOURS PRN
Qty: 28 TABLET | Refills: 0 | Status: SHIPPED | OUTPATIENT
Start: 2024-01-11 | End: 2024-01-11

## 2024-01-11 RX ORDER — ASPIRIN 325 MG
325 TABLET, DELAYED RELEASE (ENTERIC COATED) ORAL 2 TIMES DAILY
Qty: 30 TABLET | Refills: 3 | Status: SHIPPED | OUTPATIENT
Start: 2024-01-11 | End: 2024-01-11

## 2024-01-11 RX ORDER — PROMETHAZINE HYDROCHLORIDE 12.5 MG/1
12.5 TABLET ORAL EVERY 6 HOURS PRN
Qty: 14 TABLET | Refills: 0
Start: 2024-01-11 | End: 2024-01-11 | Stop reason: HOSPADM

## 2024-01-11 RX ORDER — TRAMADOL HYDROCHLORIDE 50 MG/1
50 TABLET ORAL EVERY 4 HOURS PRN
Qty: 30 TABLET | Refills: 0 | Status: SHIPPED | OUTPATIENT
Start: 2024-01-11 | End: 2024-01-16

## 2024-01-11 RX ORDER — SENNOSIDES A AND B 8.6 MG/1
1 TABLET, FILM COATED ORAL DAILY PRN
Qty: 14 TABLET | Refills: 0
Start: 2024-01-11 | End: 2024-01-11 | Stop reason: HOSPADM

## 2024-01-11 RX ADMIN — GABAPENTIN 100 MG: 100 CAPSULE ORAL at 13:29

## 2024-01-11 RX ADMIN — PROMETHAZINE HYDROCHLORIDE 12.5 MG: 12.5 TABLET ORAL at 07:46

## 2024-01-11 RX ADMIN — ACETAMINOPHEN 650 MG: 325 TABLET ORAL at 04:07

## 2024-01-11 RX ADMIN — SODIUM CHLORIDE 2000 MG: 900 INJECTION INTRAVENOUS at 06:41

## 2024-01-11 RX ADMIN — ASPIRIN 325 MG: 325 TABLET, COATED ORAL at 07:46

## 2024-01-11 RX ADMIN — TRAMADOL HYDROCHLORIDE 50 MG: 50 TABLET, COATED ORAL at 12:44

## 2024-01-11 RX ADMIN — SODIUM CHLORIDE, POTASSIUM CHLORIDE, SODIUM LACTATE AND CALCIUM CHLORIDE: 600; 310; 30; 20 INJECTION, SOLUTION INTRAVENOUS at 06:40

## 2024-01-11 RX ADMIN — GABAPENTIN 100 MG: 100 CAPSULE ORAL at 07:46

## 2024-01-11 RX ADMIN — SODIUM CHLORIDE, PRESERVATIVE FREE 10 ML: 5 INJECTION INTRAVENOUS at 07:48

## 2024-01-11 RX ADMIN — ACETAMINOPHEN 650 MG: 325 TABLET ORAL at 07:46

## 2024-01-11 RX ADMIN — FAMOTIDINE 20 MG: 20 TABLET, FILM COATED ORAL at 07:46

## 2024-01-11 RX ADMIN — TRAMADOL HYDROCHLORIDE 50 MG: 50 TABLET, COATED ORAL at 06:42

## 2024-01-11 RX ADMIN — MORPHINE SULFATE 2 MG: 2 INJECTION, SOLUTION INTRAMUSCULAR; INTRAVENOUS at 00:55

## 2024-01-11 ASSESSMENT — PAIN SCALES - GENERAL
PAINLEVEL_OUTOF10: 5
PAINLEVEL_OUTOF10: 6
PAINLEVEL_OUTOF10: 5
PAINLEVEL_OUTOF10: 7
PAINLEVEL_OUTOF10: 4
PAINLEVEL_OUTOF10: 4

## 2024-01-11 ASSESSMENT — PAIN DESCRIPTION - PAIN TYPE: TYPE: SURGICAL PAIN

## 2024-01-11 ASSESSMENT — PAIN DESCRIPTION - ONSET: ONSET: ON-GOING

## 2024-01-11 ASSESSMENT — PAIN DESCRIPTION - FREQUENCY: FREQUENCY: CONTINUOUS

## 2024-01-11 ASSESSMENT — PAIN DESCRIPTION - DESCRIPTORS: DESCRIPTORS: ACHING

## 2024-01-11 ASSESSMENT — PAIN DESCRIPTION - LOCATION: LOCATION: KNEE

## 2024-01-11 ASSESSMENT — PAIN DESCRIPTION - ORIENTATION: ORIENTATION: RIGHT

## 2024-01-11 ASSESSMENT — PAIN - FUNCTIONAL ASSESSMENT: PAIN_FUNCTIONAL_ASSESSMENT: ACTIVITIES ARE NOT PREVENTED

## 2024-01-11 NOTE — H&P
The patient's history and physical exam from her PCP clearance was reviewed.  She tested nothing is changed about her health since her clearance.        Frank Connell PA-C    Physician Assistant - Certified  Orthopedic Surgery   [facility]

## 2024-01-11 NOTE — PLAN OF CARE
[facility] [place] [facility]  Consult received.  Case reviewed with ER physician  43-year-old female status post manipulation under anesthesia of the right knee for arthrofibrosis.  Med management consult.  Full note to follow.    No primary care provider on file.    Thanks  Robyn Bose MD

## 2024-01-11 NOTE — PROGRESS NOTES
..4 Eyes Skin Assessment     NAME:  Kris Elizalde  YOB: 1980  MEDICAL RECORD NUMBER:  [id number]    The patient is being assessed for  Admission    I agree that at least one RN has performed a thorough Head to Toe Skin Assessment on the patient. ALL assessment sites listed below have been assessed.      Areas assessed by both nurses:    Head, Face, Ears, Shoulders, Back, Chest, Arms, Elbows, Hands, Sacrum. Buttock, Coccyx, Ischium, Legs. Feet and Heels, and Under Medical Devices         Does the Patient have a Wound? Yes wound(s) were present on assessment. LDA wound assessment was Initiated and completed by RN       Mazin Prevention initiated by RN: Yes  Wound Care Orders initiated by RN: Yes    Pressure Injury (Stage 3,4, Unstageable, DTI, NWPT, and Complex wounds) if present, place Wound referral order by RN under : No  surgical site     New Ostomies, if present place, Ostomy referral order under : No     Nurse 1 eSignature: Electronically signed by Carly Traore RN on 1/11/24 at 2:36 AM EST    **SHARE this note so that the co-signing nurse can place an eSignature**    Nurse 2 eSignature: {Esignature:[id number]}   
1621 Admitted to PACU from OR. Connected to monitor. Report at bedside. Oral airway removed during report. Strong pulses. Unable to get BP for 1st 15 minutes - after cuffs, coeds and arm changes moved to [place] where BP functioned. No sign of pain - not real answer when asked.  
Arrived for RT. Knee surgery. See consent. LOc x 4                H/P 12/22/23 needs up-date  
Copy of all pre-op instructions sent to pt's Email in [place] as requested. /rd 1/2  
In patient waiting for a bed. Reports no nausea and pain good at 2/10.  
Occupational Therapy  Facility/Department: [facility] 5T ORTHO/NEURO  Occupational Therapy Initial Assessment /Treatment/Discharge     Name: Kris Elizalde  : 1980  MRN: [id number]  Date of Service: 2024    Discharge Recommendations:  Home with assist PRN  OT Equipment Recommendations  Equipment Needed: No       Patient Diagnosis(es): The primary encounter diagnosis was S/P surgical manipulation of knee joint. A diagnosis of Arthrofibrosis of knee joint, right was also pertinent to this visit.  Past Medical History:  has a past medical history of Arthritis, Blurred vision, Dizziness, Migraine without aura and with status migrainosus, not intractable, Osteoarthritis, and Vertigo.  Past Surgical History:  has a past surgical history that includes joint replacement (Right); Hysterectomy; lipoma resection (Right, 2019);  section; Cholecystectomy; Dilation and curettage of uterus; and Knee Arthrotomy (Right, 1/10/2024).           Assessment   Assessment: Pt is from home in [place], with family and staying in [place] for ~1 month.  Pt demonstrates good function for ADL and mobility.  No acute OT needs identified.  Pt expresses no concerns regarding discharge to home.  Prognosis: Good  Decision Making: Medium Complexity  REQUIRES OT FOLLOW-UP: No  Activity Tolerance  Activity Tolerance: Patient Tolerated treatment well        Plan   Occupational Therapy Plan  Additional Comments: Discharge pt from acute OT     Restrictions  Position Activity Restriction  Other position/activity restrictions: WBAT    Subjective   General  Chart Reviewed: Yes  Additional Pertinent Hx: Pt admitted 1/10/24 s/p procedure: Open anterior synovectomy, suprapatellar, infrapatellar medial and  lateral gutters, right knee. 2.Extensor mechanism realignment right knee with vastus lateralis,  step-cut lengthening, medial retinaculum and MPFL lengthening and  lateral retinacular Z-plasty lengthening.  Family / Caregiver Present: Yes 
PACU Transfer to [facility]    Vitals:    01/10/24 2006   BP:    Pulse: 66   Resp: 20   Temp: 98.4 °F (36.9 °C)   SpO2: 100%         Intake/Output Summary (Last 24 hours) at 1/10/2024 2024  Last data filed at 1/10/2024 2015  Gross per 24 hour   Intake 1833 ml   Output 100 ml   Net 1733 ml       Pain assessment:  present - adequately treated  Pain Level: 2    Patient transferred to care of [facility] RN.    1/10/2024 8:24 PM    
Physical Therapy  Facility/Department: [facility] 5T ORTHO/NEURO  Physical Therapy Initial Assessment and Treatment    Name: Kris Elizalde  : 1980  MRN: [id number]  Date of Service: 2024    Discharge Recommendations:  Outpatient PT, 24 hour supervision or assist, Home with assist PRN   PT Equipment Recommendations  Equipment Needed: No (pt has rolling walker and cane)      Patient Diagnosis(es): The primary encounter diagnosis was S/P surgical manipulation of knee joint. A diagnosis of Arthrofibrosis of knee joint, right was also pertinent to this visit.  Past Medical History:  has a past medical history of Arthritis, Blurred vision, Dizziness, Migraine without aura and with status migrainosus, not intractable, Osteoarthritis, and Vertigo.  Past Surgical History:  has a past surgical history that includes joint replacement (Right); Hysterectomy; lipoma resection (Right, 2019);  section; Cholecystectomy; Dilation and curettage of uterus; and Knee Arthrotomy (Right, 1/10/2024).    Assessment   Body Structures, Functions, Activity Limitations Requiring Skilled Therapeutic Intervention: Decreased functional mobility   Assessment: Pt with slightly decreased independent mobility from baseline s/p R knee surgery.  Needing min assist for bed mobility, CG/SBA for transfers and ambulation.  Should progress well with gradual increase in activity.  Knee ROM limited by pain.  Scheduled to go to Dr. Shipley' office today for further PT.  No further acute PT needs.  Rec home with assist and cont OP PT.  Decision Making: Medium Complexity  Requires PT Follow-Up: No     Plan   Physical Therapy Plan  General Plan: Discharge with evaluation only  Safety Devices  Type of Devices: Left in chair, Call light within reach, Chair alarm in place, Nurse notified ( present)     Restrictions  Position Activity Restriction  Other position/activity restrictions: WBAT     Subjective   General  Chart Reviewed: 
Pt admitted to room 55.  Aox4 VSS. Pt oriented to the room. Call light in reach.   
Pt is alert and oriented x4. VSS on RA. Ambulating x1 walker and gait belt. Voiding adequately via BRP. Pt endorsing to pain to right knee. Pain being managed with medications per MAR. Ace wrap to right knee is CDI. IV removed without complications. Discharge instructions reviewed with pt.  at bedside to take pt home at discharge. Pt has her own rolling walker she brought with her.  
RR decreased with Fentanyl but it didn't help the pain.   Breathing normally. More awake. Pain increasing- shaking - she reports r/t pain. Warm blanket applied to help relax. Applesauce given. Dilaudid given with ultram for relief while waiting for pill to kick in.  
Report given to Maxine DAY. Room being cleaned.  
Reports nausea. 5 Compazine given.  
S: SUNI overnight, pain well controlled and block still working but gradually wearing off. Eating and drinking.     O:   Gen: NAD  CV: RR  Resp: nonlabored   Msk: Right lower extremity-sensation intact to light touch L4-S1, positive motor tibialis anterior/EHL/gastrocsoleus complex, foot is warm well-perfused, dressing is clean/dry/intact    A&P: 44yo female POD#1 status post right knee arthrotomy, lysis of adhesions, sensory mechanism realignment and SAURABH.    Admitted to Dr. Shipley  Hospitalist on board   Pain as ordered   mg BID DVT Ppx  Voiding well   PT/OT continue to treat   Senokot bowel regimen   Zofran as needed for nausea  Outpatient therapy appointment scheduled for today at the [facility] office    Dispo: discharge home today if cleared by medicine and therapy    Sincerely,    Alfredo Chang DO  Orthopaedic Sports Medicine Fellow     
potential side effects of anesthesia, such as extreme drowsiness, changes in your vital signs or breathing patterns. Nausea, headache, muscle aches, or sore throat may also occur after anesthesia.  Your nurse will help you manage these potential side effects.    2. For comfort and safety, arrange to have someone at home with you for the first 24 hours after discharge.    3. You and your family will be given written instructions about your diet, activity, dressing care, medications, and return visits.     4. Once at home, should issues with nausea, pain, or bleeding occur, or should you notice any signs of infection, you should call your surgeon.    5. Always clean your hands before and after caring for your wound. Do not let your family touch your surgery site without cleaning their hands.     6. Narcotic pain medications can cause significant constipation.  You may want to add a stool softener to your postoperative medication schedule or speak to your surgeon on how best to manage this SIDE EFFECT.    SPECIAL INSTRUCTIONS     Thank you for allowing us to care for you.  We strive to exceed your expectations in the delivery of care and service provided to you and your family.     If you need to contact the Pre-Admission Testing staff for any reason, please call us at [phone]    Instructions reviewed with patient during preadmission testing phone interview.  Jonathan Chavez RN.1/2/2024 .11:30 AM      ADDITIONAL EDUCATIONAL INFORMATION REVIEWED PER PHONE WITH YOU AND/OR YOUR FAMILY:  Yes Hibiclens® Bathing Instructions   No Antibacterial Soap

## 2024-01-11 NOTE — CONSULTS
V2.0  [facility] [place]      Name:  Kris Elizalde /Age/Sex: 1980  (43 y.o. female)   MRN & CSN:  [id number] & [id number] Encounter Date/Time: 2024 12:38 PM EST   Location:  5528/5528-01 PCP: No primary care provider on file.     Attending:Pablo Shipley MD  Consulting Provider: Fabiana Rodriguez MD      Hospital Day: 2    Assessment and Recommendations   Kris Elizalde is a 43 y.o. female with pmh of RA who presents with S/P surgical manipulation of knee joint    Hospital Problems             Last Modified POA    * (Principal) S/P surgical manipulation of knee joint 1/10/2024 Yes     Assessment/plan:    43-year-old female with history of RA status post right total knee arthrotomy postop day 1 doing well at this time.  Patient may resume her normal home medications at discharge.  From medical standpoint is cleared for discharge.        Diet ADULT DIET; Regular   DVT Prophylaxis [] Lovenox, []  Heparin, [] SCDs, [] Ambulation,  [] Eliquis, [] Xarelto   Code Status Full Code   Surrogate Decision Maker/ POA      Personally reviewed Lab Studies and Imaging           History From:    History obtained from the patient.     History of Present Illness:      Chief Complaint: Knee pain    Kris Elizalde is a 43 y.o. female who presents with knee pain      Review of Systems:      Pertinent positives and negatives discussed in HPI    Objective:     Intake/Output Summary (Last 24 hours) at 2024 1238  Last data filed at 2024 1049  Gross per 24 hour   Intake 1953 ml   Output 700 ml   Net 1253 ml      Vitals:   Vitals:    24 0642 24 0712 24 0750 24 1146   BP:   137/73 (!) 149/94   Pulse:   79 67   Resp: 16 16 16 16   Temp:   97.5 °F (36.4 °C) 97.8 °F (36.6 °C)   TempSrc:   Oral Oral   SpO2:   96% 98%   Weight:       Height:             Physical Exam:    Female sitting up in a chair  General: NAD  Eyes: EOMI  ENT: neck supple  Cardiovascular: Regular rate.  Respiratory: Clear to

## 2024-01-11 NOTE — PLAN OF CARE
Problem: Discharge Planning  Goal: Discharge to home or other facility with appropriate resources  1/11/2024 1052 by Lizette Washington, RN  Outcome: Progressing  Pt involved in discharge planning. Barriers to discharge discussed with pt. Discharge learning needs identified. Discussed with pt any additional needed resources and transportation plans.       Problem: Pain  Goal: Verbalizes/displays adequate comfort level or baseline comfort level  1/11/2024 1052 by Lizette Washington, RN  Outcome: Progressing  Pt endorsing pain to right knee. Being treated with PRN pain medication, rest, and frequent repositioning with pillow support for comfort and pressure relief. Pt reports some relief from pain with above interventions.      Problem: Safety - Adult  Goal: Free from fall injury  1/11/2024 1052 by Lizette Washington, RN  Outcome: Progressing  All fall precautions in place. Bed locked and in lowest position with alarm on. Overbed table and personal belonings within reach. Call light within reach and patient instructed to use call light for assistance. Non-skid socks on.

## 2024-01-11 NOTE — CARE COORDINATION
DISCHARGE PLANNING:  Discharge order noted.  Patient to discharge home with spouse and no further CM needs.  Plan for OP therapy today..    Bedside RN to call CM team if discharge needs arise.  Karen Lowery RN Case Manager  [phone]

## 2024-01-11 NOTE — PLAN OF CARE
HIP Flexion                Abduction                ER                IR                Extension                                 KNEE Flexion 128 60 cold            Extension +2 -6 cold  -4 after stretching                                ANKLE Dorsiflexion                Plantarflexion                Inversion                Eversion                             MMT L                MMT  R Notes      LUMBAR Flexion        Extension        Lateral flexion          Rotation                MMT L MMT R Notes         HIP Flexion          Abduction          ER          IR          Extension                     KNEE Flexion          Extension good Fair (-)                    ANKLE DF          PF          Inversion          Eversion            1/9/24  Girth Left Right Post Vaso   Knee: Midpatella 46.9 48.2     Knee: 5 cm above 49.89 50.6     Knee: 15 cm above 53.3 52.3     Ankle: transmalleolar         Ankle: figure 8                                                                 Palpation:   Unremarkable  Location:NA     Posture:   WNL     Bandages/Dressings/Incisions:  Not Applicable     Dermatomes: Abnormal findings listed below  NT     Myotomes: Abnormal findings listed below  NT     Reflexes: Abnormal findings listed below  Not tested     Specific Joint Mobility Testing/Accessory Motions:      Knee: hypomobile PFJ  50%     Special Tests:  N/A     Gait:      Pattern:  decreased knee flexion during swing phase, decreased knee extensions during heel strik   Assistive Device Used: no AD     Balance:  [x] WNL      [] NT       [] Dysfunction noted  Comment:      Falls Risk Assessment (30 days):   Falls Risk assessed and no intervention required.  Time Up and Go (TUG):   Not Assessed     Exercises/Interventions:     Therapeutic Ex (11386)  resistance Sets/time Reps Notes/Cues/Progressions   Ankle pumps    HEP   Seated hamstring stretch  30\" 5    Calf stretch with strap  30\" 5           Quad

## 2024-01-11 NOTE — OP NOTE
for the extensive rehabilitation after the operative  procedures, which were described in detail to her.    OPERATIVE FINDINGS:  This did represent a very severe contracture of the  extensor mechanism as already detailed.  Extensive scar tissue was found  in the medial gutter, the suprapatellar pouch was completely  obliterated, the lateral gutter and superior lateral scar tissue was  immense totally blocking any knee flexion and all required very  meticulous excision.  As well, the infrapatellar interval was totally  blocked of scar tissue, and this did require excision.  The extensor  mechanism realignment procedure will be dictated, but did consist of a  lengthening of the medial and lateral ligamentous tissues to restore  stability and still, however, allow for a normal mediolateral glide.   The vastus lateralis was severely contracted, and a step-cut recession  of this was performed along with repair.  The operative procedures went  very well.    A range of motion prior to the surgical procedure showed the flexion at  70 degrees, very gentle manipulation after the release of all of her  scar tissue as detailed above did restore flexion to 135 degrees.  This  is a very fortunate situation for this patient, and accordingly, she  should be able to regain a very good functional range of motion, with  the only limitation that she does have a very hard 10-degree extension  stop.  At this point, I would doubt to stretch out with passive  modalities, and this would be the determining factor if she needed a  second stage revision.    It should be noted that a total antibiotic coverage and profile for  joint replacement was utilized with double antibiotics and irrigation,  systemic antibiotics, use of tranexamic acid both intravenous and local,  the application of a local Orthomix block intramuscular medially and  laterally and dilute Voltaren in the wound closure.    OPERATIVE PROCEDURE:  This patient was placed in

## 2024-01-12 ENCOUNTER — HOSPITAL ENCOUNTER (OUTPATIENT)
Dept: PHYSICAL THERAPY | Age: 44
Setting detail: THERAPIES SERIES
Discharge: HOME | End: 2024-01-12
Payer: COMMERCIAL

## 2024-01-12 DIAGNOSIS — M24.661 ARTHROFIBROSIS OF KNEE JOINT, RIGHT: Primary | ICD-10-CM

## 2024-01-12 PROCEDURE — 97112 NEUROMUSCULAR REEDUCATION: CPT | Performed by: PHYSICAL THERAPIST

## 2024-01-12 PROCEDURE — 97016 VASOPNEUMATIC DEVICE THERAPY: CPT | Performed by: PHYSICAL THERAPIST

## 2024-01-12 PROCEDURE — 97110 THERAPEUTIC EXERCISES: CPT | Performed by: PHYSICAL THERAPIST

## 2024-01-12 PROCEDURE — 97140 MANUAL THERAPY 1/> REGIONS: CPT | Performed by: PHYSICAL THERAPIST

## 2024-01-12 RX ORDER — METHYLPREDNISOLONE 4 MG/1
TABLET ORAL
Qty: 1 KIT | Refills: 0 | Status: SHIPPED | OUTPATIENT
Start: 2024-01-15

## 2024-01-12 NOTE — FLOWSHEET NOTE
[facility]- Outpatient Rehabilitation and Therapy [address], [place] [address] office: [phone] fax: [phone]          Physical Therapy: TREATMENT/PROGRESS NOTE   Patient: Kris Elizalde (43 y.o. female)   Treatment Date: 2024   :  1980 MRN: [id number]   Visit #: 4   Insurance Allowable Auth Needed   25 []Yes    [x]No    Insurance: Payor: [facility] / Plan: [facility] - [place] PPO / Product Type: *No Product type* / $0 copay/25 vpcy/no auth  Insurance ID: [id number] - ([facility])  Secondary Insurance (if applicable):    Treatment Diagnosis:   1. Right knee pain, unspecified chronicity  M25.561         2. Arthrofibrosis of knee joint, right  M24.661         3. Edema of knee  M25.469         4. Gait abnormality  R26.9         5. 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                             Plan of care signed (Y/N):     Date of Patient follow up with Physician:      Progress Report/POC: Date Range for this report: 10/26/23-24  POC update due: 2024 (OR 10 visits /OR AUTH LIMITS, whichever is less)      Preferred Language for Healthcare:   [x]English       []other:    SUBJECTIVE EXAMINATION     Patient Report/Comments:  2 days s/p right DANITZA/SAURABH.  Reports that she is currently taking Gabapentin, tylenol, tramadol, promethazine.  Using rolling walker for ambulation.  Reports pain at 5-6/10.  Notes that she was able to sleep relatively well last night.       Test used Initial score  10/26/23 2024 01/11/24   Pain Summary VAS 4/10 6-7/10 6-7/10   Functional questionnaire LEFS 74% deficit 76% deficit 100% deficit   Other:                  OBJECTIVE EXAMINATION     Observation:     Test measurements:     ROM/Strength: (Blank cells denote NT)    24      AROM L AROM R Notes PROM L PROM R Notes

## 2024-01-13 LAB
BACTERIA SNV CULT: NORMAL
BACTERIA SPEC AEROBE CULT: NORMAL
BACTERIA SPEC ANAEROBE CULT: NORMAL
GRAM STN SPEC: NORMAL

## 2024-01-13 NOTE — DISCHARGE SUMMARY
[facility]  Discharge Summary    Date:  2024    Name:  Kris Elizalde  Address:  [address] [place] [id number]    :  1980      Age:   43 y.o.    SSN:  [id number]      Medical Record Number:  [id number]  Admit Date: 1/10/2024  Date of Discharge: 2024    PROCEDURE:   Arthroscopic lysis of adhesions right knee secondary to arthrofibrosis     REASON FOR ADMISSION:    Postoperative pain control, vital sign monitoring, physical therapy, and IV fluid resuscitation.    Current Medications:        Review of Systems:  Unchanged from preoperative H&P     Past Medical History:  Past Medical History:   Diagnosis Date    Arthritis     Blurred vision     Dizziness     Migraine without aura and with status migrainosus, not intractable     Osteoarthritis     Vertigo        Past Surgical History:  Past Surgical History:   Procedure Laterality Date     SECTION      x3    CHOLECYSTECTOMY      DILATION AND CURETTAGE OF UTERUS      HYSTERECTOMY (CERVIX STATUS UNKNOWN)      JOINT REPLACEMENT Right     11/10/2021    KNEE ARTHROTOMY Right 1/10/2024    RIGHT KNEE ANTERIOR ARTHROTOMY WITH ANTERIOR SYNOVECTOMY AND RECONSTRUCTION WITH EXTENSOR REALIGMENT REALIGNMENT WITH MANIPULATION UNDER ANESTHESIA performed by Pablo Shipley MD at [facility] OR    LIPOMA RESECTION Right 2019    arm       Medications:  No current facility-administered medications on file prior to encounter.     Current Outpatient Medications on File Prior to Encounter   Medication Sig Dispense Refill    Magnesium Oxide (MAGNESIUM-OXIDE) 250 MG TABS tablet Take by mouth      Methotrexate Sodium, PF, 50 MG/2ML SOLN chemo injection INJECT 0.6ML ONCE A WEEK.. SINGLE USE VIAL. DISCARD REMAINING AFTER INJECTION. (Patient not taking: Reported on 1/10/2024)      Naltrexone HCl, Pain, 1.5 MG CAPS       nortriptyline (PAMELOR) 25 MG capsule Take 1 capsule by mouth nightly      golimumab (SIMPONI ARIA) 50

## 2024-01-15 ENCOUNTER — TELEPHONE (OUTPATIENT)
Dept: ORTHOPEDIC SURGERY | Age: 44
End: 2024-01-15

## 2024-01-15 NOTE — TELEPHONE ENCOUNTER
Spoke with patient about her current condition.  She still notes some swelling in her hands in the morning and feels that this is a toyed arthritis flare.  She is starting her Medrol Dosepak this morning.  She feels her condition with regards to her nausea and dizziness is improving and she states that she feels it may be coming from the gabapentin.  She did not take any yesterday and feels a bit better.  We will discontinue the gabapentin at this time.  She feels that Tylenol, Celebrex, and the Ultram are controlling her pain.  Are still attempting to get her into our rheumatologist Dr. Camryn Rivera.  All of her questions were answered and she agrees with our current plan.        Frank Connell PA-C    Physician Assistant - Certified  Orthopedic Surgery   [facility]    01/15/24 12:47 PM

## 2024-01-16 ENCOUNTER — HOSPITAL ENCOUNTER (OUTPATIENT)
Dept: PHYSICAL THERAPY | Age: 44
Setting detail: THERAPIES SERIES
Discharge: HOME | End: 2024-01-16
Payer: COMMERCIAL

## 2024-01-16 PROCEDURE — 97110 THERAPEUTIC EXERCISES: CPT | Performed by: PHYSICAL THERAPIST

## 2024-01-16 PROCEDURE — 97016 VASOPNEUMATIC DEVICE THERAPY: CPT | Performed by: PHYSICAL THERAPIST

## 2024-01-16 PROCEDURE — 97140 MANUAL THERAPY 1/> REGIONS: CPT | Performed by: PHYSICAL THERAPIST

## 2024-01-16 PROCEDURE — 97112 NEUROMUSCULAR REEDUCATION: CPT | Performed by: PHYSICAL THERAPIST

## 2024-01-16 NOTE — FLOWSHEET NOTE
[facility]- Outpatient Rehabilitation and Therapy [address], [place] [address] office: [phone] fax: [phone]          Physical Therapy: TREATMENT/PROGRESS NOTE   Patient: Kris Elizalde (43 y.o. female)   Treatment Date: 2024   :  1980 MRN: [id number]   Visit #: 5   Insurance Allowable Auth Needed   25 []Yes    [x]No    Insurance: Payor: [employer] / Plan: [employer] - [place] PPO / Product Type: *No Product type* / $0 copay/25 vpcy/no auth  Insurance ID: [id number] - ([facility])  Secondary Insurance (if applicable):    Treatment Diagnosis:   1. Right knee pain, unspecified chronicity  M25.561         2. Arthrofibrosis of knee joint, right  M24.661         3. Edema of knee  M25.469         4. Gait abnormality  R26.9         5. 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                             Plan of care signed (Y/N):     Date of Patient follow up with Physician:      Progress Report/POC: Date Range for this report: 10/26/23-24  POC update due: 2024 (OR 10 visits /OR AUTH LIMITS, whichever is less)    Patient seen in consultation with Dr. Shipley who established the initial/subsequent treatment protocol.   24: seen by Frank- d/c gabapentin secondary to nausea/dizziness.  Begin MDP.  Contact Dr Rivera for early consult.    Preferred Language for Healthcare:   [x]English       []other:    SUBJECTIVE EXAMINATION     Patient Report/Comments:  6 days s/p right DANITZA/SAURABH.  Reports that she is currently taking  tylenol, tramadol, promethazine.  Using rolling walker for ambulation.  Reports that her pain is relatively well controlled.  She has been taking Celebrex as well, however began the MDP this AM.  Reports compliance with HEP.       Test used Initial score  10/26/23 2024 01/11/24   Pain Summary VAS 4/10 6-7/10 6-7/10

## 2024-01-17 ENCOUNTER — HOSPITAL ENCOUNTER (OUTPATIENT)
Dept: PHYSICAL THERAPY | Age: 44
Setting detail: THERAPIES SERIES
Discharge: HOME | End: 2024-01-17
Payer: COMMERCIAL

## 2024-01-17 PROCEDURE — 97110 THERAPEUTIC EXERCISES: CPT | Performed by: PHYSICAL THERAPY ASSISTANT

## 2024-01-17 PROCEDURE — 97016 VASOPNEUMATIC DEVICE THERAPY: CPT | Performed by: PHYSICAL THERAPY ASSISTANT

## 2024-01-17 PROCEDURE — 97112 NEUROMUSCULAR REEDUCATION: CPT | Performed by: PHYSICAL THERAPY ASSISTANT

## 2024-01-17 PROCEDURE — 97140 MANUAL THERAPY 1/> REGIONS: CPT | Performed by: PHYSICAL THERAPY ASSISTANT

## 2024-01-17 NOTE — FLOWSHEET NOTE
[facility]- Outpatient Rehabilitation and Therapy [address], [place] [address] office: [phone] fax: [phone]          Physical Therapy: TREATMENT/PROGRESS NOTE   Patient: Kris Elizalde (43 y.o. female)   Treatment Date: 2024   :  1980 MRN: [id number]   Visit #: 6   Insurance Allowable Auth Needed   25 []Yes    [x]No    Insurance: Payor: BCBS / Plan: BCBS OUT OF STATE / Product Type: *No Product type* / $0 copay/25 vpcy/no auth  Insurance ID: [id number] - ([place] BCBS)  Secondary Insurance (if applicable):    Treatment Diagnosis:   1. Right knee pain, unspecified chronicity  M25.561         2. Arthrofibrosis of knee joint, right  M24.661         3. Edema of knee  M25.469         4. Gait abnormality  R26.9         5. 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                             Plan of care signed (Y/N):     Date of Patient follow up with Physician:      Progress Report/POC: Date Range for this report: 10/26/23-24  POC update due: 2024 (OR 10 visits /OR AUTH LIMITS, whichever is less)    Patient seen in consultation with Dr. Shipley who established the initial/subsequent treatment protocol.   24: seen by Frank- d/c gabapentin secondary to nausea/dizziness.  Begin MDP.  Contact Dr Rivera for early consult.    Preferred Language for Healthcare:   [x]English       []other:    SUBJECTIVE EXAMINATION     Patient Report/Comments:  7 days s/p right DANITZA/SAURABH.  Using rolling walker for ambulation.  Reports that her pain is relatively well controlled. Notices increased soreness swelling with walking. Reports compliance with HEP.       Test used Initial score  10/26/23 2024 01/11/24   Pain Summary VAS 4/10 6-7/10 6-7/10   Functional questionnaire LEFS 74% deficit 76% deficit 100% deficit   Other:

## 2024-01-18 ENCOUNTER — HOSPITAL ENCOUNTER (OUTPATIENT)
Dept: PHYSICAL THERAPY | Age: 44
Setting detail: THERAPIES SERIES
Discharge: HOME | End: 2024-01-18
Payer: COMMERCIAL

## 2024-01-18 PROCEDURE — 97140 MANUAL THERAPY 1/> REGIONS: CPT | Performed by: PHYSICAL THERAPY ASSISTANT

## 2024-01-18 PROCEDURE — 97110 THERAPEUTIC EXERCISES: CPT | Performed by: PHYSICAL THERAPY ASSISTANT

## 2024-01-18 PROCEDURE — 97112 NEUROMUSCULAR REEDUCATION: CPT | Performed by: PHYSICAL THERAPY ASSISTANT

## 2024-01-18 PROCEDURE — 97016 VASOPNEUMATIC DEVICE THERAPY: CPT | Performed by: PHYSICAL THERAPY ASSISTANT

## 2024-01-18 NOTE — FLOWSHEET NOTE
[facility]- Outpatient Rehabilitation and Therapy [address], [place] [address] office: [phone] fax: [phone]          Physical Therapy: TREATMENT/PROGRESS NOTE   Patient: Kris Elizalde (43 y.o. female)   Treatment Date: 2024   :  1980 MRN: [id number]   Visit #: 7   Insurance Allowable Auth Needed   25 []Yes    [x]No    Insurance: Payor: BCBS / Plan: BCBS OUT OF STATE / Product Type: *No Product type* / $0 copay/25 vpcy/no auth  Insurance ID: [id number] - ([place] BCBS)  Secondary Insurance (if applicable):    Treatment Diagnosis:   1. Right knee pain, unspecified chronicity  M25.561         2. Arthrofibrosis of knee joint, right  M24.661         3. Edema of knee  M25.469         4. Gait abnormality  R26.9         5. 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                             Plan of care signed (Y/N):     Date of Patient follow up with Physician:      Progress Report/POC: Date Range for this report: 10/26/23-24  POC update due: 2024 (OR 10 visits /OR AUTH LIMITS, whichever is less)    Patient seen in consultation with Dr. Shipley who established the initial/subsequent treatment protocol.   24: seen by Frank- d/c gabapentin secondary to nausea/dizziness.  Begin MDP.  Contact Dr Rivera for early consult.  24: ok to remove bandage and sutures, will monitor swelling    Preferred Language for Healthcare:   [x]English       []other:    SUBJECTIVE EXAMINATION     Patient Report/Comments:  7 days s/p right DANITZA/SAURBAH.  Using rolling walker for ambulation.  Reports that her pain is relatively well controlled. Notices increased soreness swelling with walking. Reports compliance with HEP.       Test used Initial score  10/26/23 2024 01/11/24   Pain Summary VAS 4/10 6-7/10 6-7/10   Functional

## 2024-01-19 ENCOUNTER — HOSPITAL ENCOUNTER (OUTPATIENT)
Dept: PHYSICAL THERAPY | Age: 44
Setting detail: THERAPIES SERIES
Discharge: HOME | End: 2024-01-19
Payer: COMMERCIAL

## 2024-01-19 PROCEDURE — 97110 THERAPEUTIC EXERCISES: CPT | Performed by: PHYSICAL THERAPIST

## 2024-01-19 PROCEDURE — 97016 VASOPNEUMATIC DEVICE THERAPY: CPT | Performed by: PHYSICAL THERAPIST

## 2024-01-19 PROCEDURE — 97140 MANUAL THERAPY 1/> REGIONS: CPT | Performed by: PHYSICAL THERAPIST

## 2024-01-19 PROCEDURE — 97112 NEUROMUSCULAR REEDUCATION: CPT | Performed by: PHYSICAL THERAPIST

## 2024-01-19 NOTE — FLOWSHEET NOTE
throwing, pushing, pulling, jumping.)  Direct, one on one contact, billed in 15-minute increments.    TREATMENT PLAN   Plan: Cont POC- Continue emphasis/focus on exercise progression, modulating pain, promoting relaxation, increasing ROM, reduce/eliminate soft tissue swelling/inflammation/restriction, and improving soft tissue extensibility. Next visit plan to add new exercises and continue max protect phase    4x/week for 6 weeks  Remove sutures    Electronically Signed by Jerardo Mary PT       Date: 01/19/2024     Note: If patient does not return for scheduled/recommended follow up visits, this note will serve as a discharge from care along with the most recent update on progress.

## 2024-01-22 ENCOUNTER — HOSPITAL ENCOUNTER (OUTPATIENT)
Dept: PHYSICAL THERAPY | Age: 44
Setting detail: THERAPIES SERIES
Discharge: HOME | End: 2024-01-22
Payer: COMMERCIAL

## 2024-01-22 DIAGNOSIS — M24.661 ARTHROFIBROSIS OF KNEE JOINT, RIGHT: Primary | ICD-10-CM

## 2024-01-22 PROCEDURE — 97112 NEUROMUSCULAR REEDUCATION: CPT | Performed by: PHYSICAL THERAPY ASSISTANT

## 2024-01-22 PROCEDURE — 97140 MANUAL THERAPY 1/> REGIONS: CPT | Performed by: PHYSICAL THERAPY ASSISTANT

## 2024-01-22 PROCEDURE — 97110 THERAPEUTIC EXERCISES: CPT | Performed by: PHYSICAL THERAPY ASSISTANT

## 2024-01-22 PROCEDURE — 97016 VASOPNEUMATIC DEVICE THERAPY: CPT | Performed by: PHYSICAL THERAPY ASSISTANT

## 2024-01-22 RX ORDER — TRAMADOL HYDROCHLORIDE 50 MG/1
50 TABLET ORAL EVERY 6 HOURS PRN
Qty: 20 TABLET | Refills: 0 | Status: SHIPPED | OUTPATIENT
Start: 2024-01-22 | End: 2024-01-26 | Stop reason: ALTCHOICE

## 2024-01-22 NOTE — FLOWSHEET NOTE
6-7/10 6-7/10   Functional questionnaire LEFS 74% deficit 76% deficit 100% deficit   Other:                  OBJECTIVE EXAMINATION     Observation:     Test measurements:     ROM/Strength: (Blank cells denote NT)    1/19/24      AROM L AROM R Notes PROM L PROM R Notes                        LUMBAR Flexion              Extension              Sidebend                Rotation                                       HIP Flexion                Abduction                ER                IR                Extension                                 KNEE Flexion 128      80 ERMI      Extension +2 -4 cold  -1 after stretching                                ANKLE Dorsiflexion                Plantarflexion                Inversion                Eversion                             MMT L                MMT  R Notes      LUMBAR Flexion        Extension        Lateral flexion          Rotation                MMT L MMT R Notes         HIP Flexion          Abduction          ER          IR          Extension                     KNEE Flexion          Extension good Fair (-)                    ANKLE DF          PF          Inversion          Eversion            1/22/24  Girth Left Right Post Vaso   Knee: Midpatella 46.9 49.8     Knee: 5 cm above 49.8 51.2     Knee: 15 cm above 53.3 53.2     Ankle: transmalleolar         Ankle: figure 8                                                                 Palpation:   Unremarkable  Location:NA     Posture:   WNL     Bandages/Dressings/Incisions:  Not Applicable     Dermatomes: Abnormal findings listed below  NT     Myotomes: Abnormal findings listed below  NT     Reflexes: Abnormal findings listed below  Not tested     Specific Joint Mobility Testing/Accessory Motions:      Knee: hypomobile PFJ  50%     Special Tests:  N/A     Gait:      Pattern:  decreased knee flexion during swing phase, decreased knee extensions during heel strik   Assistive Device Used: no AD     Balance:  [x] WNL      []

## 2024-01-23 ENCOUNTER — PROCEDURE VISIT (OUTPATIENT)
Dept: ORTHOPEDIC SURGERY | Age: 44
End: 2024-01-23

## 2024-01-23 ENCOUNTER — HOSPITAL ENCOUNTER (OUTPATIENT)
Dept: PHYSICAL THERAPY | Age: 44
Setting detail: THERAPIES SERIES
Discharge: HOME | End: 2024-01-23
Payer: COMMERCIAL

## 2024-01-23 DIAGNOSIS — M24.661 ARTHROFIBROSIS OF KNEE JOINT, RIGHT: Primary | ICD-10-CM

## 2024-01-23 DIAGNOSIS — Z09 POSTOP CHECK: Primary | ICD-10-CM

## 2024-01-23 DIAGNOSIS — Z48.02 VISIT FOR SUTURE REMOVAL: ICD-10-CM

## 2024-01-23 DIAGNOSIS — M24.661 ARTHROFIBROSIS OF KNEE JOINT, RIGHT: ICD-10-CM

## 2024-01-23 PROCEDURE — 99024 POSTOP FOLLOW-UP VISIT: CPT | Performed by: PHYSICIAN ASSISTANT

## 2024-01-23 PROCEDURE — 97140 MANUAL THERAPY 1/> REGIONS: CPT | Performed by: PHYSICAL THERAPIST

## 2024-01-23 PROCEDURE — 97112 NEUROMUSCULAR REEDUCATION: CPT | Performed by: PHYSICAL THERAPIST

## 2024-01-23 PROCEDURE — 97016 VASOPNEUMATIC DEVICE THERAPY: CPT | Performed by: PHYSICAL THERAPIST

## 2024-01-23 PROCEDURE — 97110 THERAPEUTIC EXERCISES: CPT | Performed by: PHYSICAL THERAPIST

## 2024-01-23 RX ORDER — PREDNISONE 5 MG/1
5 TABLET ORAL 2 TIMES DAILY
Qty: 28 TABLET | Refills: 0 | Status: SHIPPED | OUTPATIENT
Start: 2024-01-23 | End: 2024-02-06

## 2024-01-23 NOTE — PROGRESS NOTES
Procedure: Suture removal    Patient returns to clinic for follow up of suture removal.  Patient has no complaints at this time.  Patient was educated on the procedure for suture removal.  Patient consented to the procedure.  Surgical incision is well-healed.  Area over the sutures was cleaned with chlorhexidine swab.  10 simple interrupted sutures were removed from the right knee.  Steri-Strips were then applied to the area to minimize tension on healing incision.  The patient was educated on care for the Steri-Strips and wound healing.  The patient was educated on appropriate wound cleaning, to avoid scrubbing the wound, and to patted dry after appropriate cleaning.  The patient was also educated to avoid submerging the wound and to avoid pools, baths, or hot tubs.  The patient was educated on signs and symptoms for infection for which she should call the office and or go to the emergency department immediately.  Patient has no further questions at this time.      Encounter Diagnoses   Name Primary?    Postop check Yes    Visit for suture removal     Arthrofibrosis of knee joint, right              Frank Connell PA-C    Physician Assistant - Certified  Orthopedic Surgery   [facility]    01/23/24 12:08 PM

## 2024-01-23 NOTE — FLOWSHEET NOTE
[facility]- Outpatient Rehabilitation and Therapy [address], [place] [address] office: [phone] fax: [phone]          Physical Therapy: TREATMENT/PROGRESS NOTE   Patient: Kris Elizalde (43 y.o. female)   Treatment Date: 2024   :  1980 MRN: [id number]   Visit #: 10   Insurance Allowable Auth Needed   25 []Yes    [x]No    Insurance: Payor: BCBS / Plan: BCBS OUT OF STATE / Product Type: *No Product type* / $0 copay/25 vpcy/no auth  Insurance ID: [id number] - ([place] BCBS)  Secondary Insurance (if applicable):    Treatment Diagnosis:   1. Right knee pain, unspecified chronicity  M25.561         2. Arthrofibrosis of knee joint, right  M24.661         3. Edema of knee  M25.469         4. Gait abnormality  R26.9         5. Decreased strength of lower extremity  R29.898          Medical Diagnosis:    M24.661 Anklyosis, right knee  Right knee DANITZA/SAURABH 1/10/24   Referring Physician: Pablo Shipley PCP: No primary care provider on file.                             Plan of care signed (Y/N):     Date of Patient follow up with Physician:      Progress Report/POC: Date Range for this report: 10/26/23-24  POC update due: 2024 (OR 10 visits /OR AUTH LIMITS, whichever is less)    Patient seen in consultation with Dr. Shipley who established the initial/subsequent treatment protocol.   24: seen by Frank- d/c gabapentin secondary to nausea/dizziness.  Begin MDP.  Contact Dr Rivera for early consult.  24: ok to remove bandage and sutures, will monitor swelling  24: seen by Dr Shipley-begin 10mg prednisone. Pt seeing Dr Rivera on Thursday.  Continue focus on increased flexion ROM.  Consider SAURABH in 2 weeks if ROM is not progressing well.    Preferred Language for Healthcare:   [x]English       []other:    SUBJECTIVE EXAMINATION     Patient Report/Comments:  13 days s/p right DANITZA/SAURABH.  Notes that she finished the MDP on .  She is currently taking Celebrex.  She

## 2024-01-25 ENCOUNTER — HOSPITAL ENCOUNTER (OUTPATIENT)
Dept: PHYSICAL THERAPY | Age: 44
Setting detail: THERAPIES SERIES
Discharge: HOME | End: 2024-01-25
Payer: COMMERCIAL

## 2024-01-25 ENCOUNTER — TELEPHONE (OUTPATIENT)
Dept: ORTHOPEDIC SURGERY | Age: 44
End: 2024-01-25

## 2024-01-25 PROCEDURE — 97140 MANUAL THERAPY 1/> REGIONS: CPT | Performed by: PHYSICAL THERAPIST

## 2024-01-25 PROCEDURE — 97112 NEUROMUSCULAR REEDUCATION: CPT | Performed by: PHYSICAL THERAPIST

## 2024-01-25 PROCEDURE — 97016 VASOPNEUMATIC DEVICE THERAPY: CPT | Performed by: PHYSICAL THERAPIST

## 2024-01-25 PROCEDURE — 97110 THERAPEUTIC EXERCISES: CPT | Performed by: PHYSICAL THERAPIST

## 2024-01-25 NOTE — TELEPHONE ENCOUNTER
Faxing Patient Information     Facility Name: [facility]   Contact Name: FREDY OUSMANE  Contact Number: [phone]  Facility Fax Number: [phone]    [facility] WITH [facility] CALLED TO REQUEST MOST RECENT LAB WORK OF PATIENT FROM DR SANTILLAN.     PLEASE ADVISE

## 2024-01-25 NOTE — FLOWSHEET NOTE
complexities/Impairments listed.  [] Progression has been slowed due to co-morbidities.  [x] Plan just implemented, too soon (<30days) to assess goals progression   [] Goals require adjustment due to lack of progress  [] Patient is not progressing as expected and requires additional follow up with physician  [] Other:     CHARGE CAPTURE     CHARGE GRID   CPT Code (TIMED) minutes # CPT Code (UNTIMED) #     [x] Therex (81583)   1  [] EVAL:LOW (14446 - Typically 20 minutes face-to-face)      Neuromusc. Re-ed (08998)  1  [] Re-Eval (10321)     [x] Manual (11203)  1  [] Estim Unattended (09567)     [] Ther. Act (95290)    [] Mech. Traction (52033)     [] Gait (37294)    [] Dry Needle 1-2 muscle (20560)     [] Aquatic Therex (80524)    [] Dry Needle 3+ muscle (20561)     [] Iontophoresis (13966)    [x] VASO (68806) 1    [] Ultrasound (84793)    [] Group Therapy (07839)     [] Estim Attended (71450)    [] Other:    Total Timed Code Tx Minutes 48'        Total Treatment Minutes 2:20-4:00  100'        Charge Justification:  (97082) THERAPEUTIC EXERCISE - Provided verbal/tactile cueing for activities related to strengthening, flexibility, endurance, ROM performed to prevent loss of range of motion, maintain or improve muscular strength or increase flexibility, following either an injury or surgery.   (83746) HOME EXERCISE PROGRAM - Reviewed/Progressed HEP activities related to strengthening, flexibility, endurance, ROM performed to prevent loss of range of motion, maintain or improve muscular strength or increase flexibility, following either an injury or surgery.  (65263) NEUROMUSCULAR RE-EDUCATION - Therapeutic procedure, 1 or more areas, each 15 minutes; neuromuscular reeducation of movement, balance, coordination, kinesthetic sense, posture, and/or proprioception for sitting and/or standing activities  (81556) HOME EXERCISE PROGRAM - Reviewed/Progressed HEP activities related to neuromuscular reeducation of movement,

## 2024-01-26 ENCOUNTER — HOSPITAL ENCOUNTER (OUTPATIENT)
Dept: PHYSICAL THERAPY | Age: 44
Setting detail: THERAPIES SERIES
Discharge: HOME | End: 2024-01-26
Payer: COMMERCIAL

## 2024-01-26 DIAGNOSIS — M24.661 ARTHROFIBROSIS OF KNEE JOINT, RIGHT: Primary | ICD-10-CM

## 2024-01-26 PROCEDURE — 97110 THERAPEUTIC EXERCISES: CPT | Performed by: PHYSICAL THERAPIST

## 2024-01-26 PROCEDURE — 97016 VASOPNEUMATIC DEVICE THERAPY: CPT | Performed by: PHYSICAL THERAPIST

## 2024-01-26 PROCEDURE — 97112 NEUROMUSCULAR REEDUCATION: CPT | Performed by: PHYSICAL THERAPIST

## 2024-01-26 PROCEDURE — 97140 MANUAL THERAPY 1/> REGIONS: CPT | Performed by: PHYSICAL THERAPIST

## 2024-01-26 RX ORDER — ACETAMINOPHEN AND CODEINE PHOSPHATE 300; 30 MG/1; MG/1
1 TABLET ORAL EVERY 6 HOURS PRN
Qty: 20 TABLET | Refills: 0 | Status: SHIPPED | OUTPATIENT
Start: 2024-01-26 | End: 2024-01-31

## 2024-01-26 NOTE — PROGRESS NOTES
The patient states that Ultram is causing her nausea and grogginess.  Were going to attempt to de-escalate her pain regimen with Tylenol 3.  However if this does not adequately control her pain we may utilize hydromorphone 1 mg every 8-12 hours. The patient is unable to take percocet or norco.  Risks and benefits of these medications were discussed.  Appropriate position of this medication was reviewed.  All the patient's questions were answered and she agrees with this plan.        Frank Connell PA-C    Physician Assistant - Certified  Orthopedic Surgery   [facility]    01/26/24 9:58 AM

## 2024-01-26 NOTE — FLOWSHEET NOTE
[facility]- Outpatient Rehabilitation and Therapy [address], [place] [address] office: [phone] fax: [phone]          Physical Therapy: TREATMENT/PROGRESS NOTE   Patient: Kris Elizalde (43 y.o. female)   Treatment Date: 2024   :  1980 MRN: [id number]   Visit #: 12   Insurance Allowable Auth Needed   25 []Yes    [x]No    Insurance: Payor: BCBS / Plan: BCBS OUT OF STATE / Product Type: *No Product type* / $0 copay/25 vpcy/no auth  Insurance ID: [id number] - ([place] BCBS)  Secondary Insurance (if applicable):    Treatment Diagnosis:   1. Right knee pain, unspecified chronicity  M25.561         2. Arthrofibrosis of knee joint, right  M24.661         3. Edema of knee  M25.469         4. Gait abnormality  R26.9         5. Decreased strength of lower extremity  R29.898          Medical Diagnosis:    M24.661 Anklyosis, right knee  Right knee DANITZA/SAURABH 1/10/24   Referring Physician: Pablo Shipley PCP: No primary care provider on file.                             Plan of care signed (Y/N):     Date of Patient follow up with Physician:      Progress Report/POC: Date Range for this report: 10/26/23-24  POC update due: 2024 (OR 10 visits /OR AUTH LIMITS, whichever is less)    Patient seen in consultation with Dr. Shipley who established the initial/subsequent treatment protocol.   24: seen by Frank- d/c gabapentin secondary to nausea/dizziness.  Begin MDP.  Contact Dr Rivera for early consult.  24: ok to remove bandage and sutures, will monitor swelling  24: seen by Dr Shipley-begin 10mg prednisone. Pt seeing Dr Rivera on Thursday.  Continue focus on increased flexion ROM.  Consider SAURABH in 2 weeks if ROM is not progressing well.    Preferred Language for Healthcare:   [x]English       []other:    SUBJECTIVE EXAMINATION     Patient Report/Comments:  16 days s/p right DANITZA/SAURABH.  She denies increased pain since previous therapy session.  Tolerating HEP well.

## 2024-01-29 ENCOUNTER — HOSPITAL ENCOUNTER (OUTPATIENT)
Dept: PHYSICAL THERAPY | Age: 44
Setting detail: THERAPIES SERIES
Discharge: HOME | End: 2024-01-29
Payer: COMMERCIAL

## 2024-01-29 PROCEDURE — 97112 NEUROMUSCULAR REEDUCATION: CPT | Performed by: PHYSICAL THERAPY ASSISTANT

## 2024-01-29 PROCEDURE — 97110 THERAPEUTIC EXERCISES: CPT | Performed by: PHYSICAL THERAPY ASSISTANT

## 2024-01-29 PROCEDURE — 97140 MANUAL THERAPY 1/> REGIONS: CPT | Performed by: PHYSICAL THERAPY ASSISTANT

## 2024-01-29 PROCEDURE — 97016 VASOPNEUMATIC DEVICE THERAPY: CPT | Performed by: PHYSICAL THERAPY ASSISTANT

## 2024-01-29 NOTE — FLOWSHEET NOTE
Progress Update:  [] Patient is progressing as expected towards functional goals listed.    [] Progression is slowed due to complexities/Impairments listed.  [] Progression has been slowed due to co-morbidities.  [x] Plan just implemented, too soon (<30days) to assess goals progression   [] Goals require adjustment due to lack of progress  [] Patient is not progressing as expected and requires additional follow up with physician  [] Other:     CHARGE CAPTURE     CHARGE GRID   CPT Code (TIMED) minutes # CPT Code (UNTIMED) #     [x] Therex (86314)   1  [] EVAL:LOW (12387 - Typically 20 minutes face-to-face)      Neuromusc. Re-ed (13032)  1  [] Re-Eval (89053)     [x] Manual (11093)  1  [] Estim Unattended (86536)     [] Ther. Act (81444)    [] Mech. Traction (39154)     [] Gait (37395)    [] Dry Needle 1-2 muscle (88373)     [] Aquatic Therex (54630)    [] Dry Needle 3+ muscle (20561)     [] Iontophoresis (24801)    [x] VASO (43634) 1    [] Ultrasound (25180)    [] Group Therapy (79152)     [] Estim Attended (08543)    [] Other:    Total Timed Code Tx Minutes 48'        Total Treatment Minutes 2:20- 3:45 75'        Charge Justification:  (45848) THERAPEUTIC EXERCISE - Provided verbal/tactile cueing for activities related to strengthening, flexibility, endurance, ROM performed to prevent loss of range of motion, maintain or improve muscular strength or increase flexibility, following either an injury or surgery.   (04712) HOME EXERCISE PROGRAM - Reviewed/Progressed HEP activities related to strengthening, flexibility, endurance, ROM performed to prevent loss of range of motion, maintain or improve muscular strength or increase flexibility, following either an injury or surgery.  (16713) NEUROMUSCULAR RE-EDUCATION - Therapeutic procedure, 1 or more areas, each 15 minutes; neuromuscular reeducation of movement, balance, coordination, kinesthetic sense, posture, and/or proprioception for sitting and/or standing

## 2024-01-30 ENCOUNTER — HOSPITAL ENCOUNTER (OUTPATIENT)
Dept: PHYSICAL THERAPY | Age: 44
Setting detail: THERAPIES SERIES
Discharge: HOME | End: 2024-01-30
Payer: COMMERCIAL

## 2024-01-30 PROCEDURE — 97016 VASOPNEUMATIC DEVICE THERAPY: CPT | Performed by: PHYSICAL THERAPY ASSISTANT

## 2024-01-30 PROCEDURE — 97112 NEUROMUSCULAR REEDUCATION: CPT | Performed by: PHYSICAL THERAPY ASSISTANT

## 2024-01-30 PROCEDURE — 97110 THERAPEUTIC EXERCISES: CPT | Performed by: PHYSICAL THERAPY ASSISTANT

## 2024-01-30 PROCEDURE — 97140 MANUAL THERAPY 1/> REGIONS: CPT | Performed by: PHYSICAL THERAPY ASSISTANT

## 2024-01-30 NOTE — FLOWSHEET NOTE
of movement, balance, coordination, kinesthetic sense, posture, and/or proprioception for sitting and/or standing activities  (31898) HOME EXERCISE PROGRAM - Reviewed/Progressed HEP activities related to neuromuscular reeducation of movement, balance, coordination, kinesthetic sense, posture, and/or proprioception for sitting and/or standing activities    (34892) THERAPEUTIC ACTIVITY - use of dynamic activities to improve functional performance. (Ex include squatting, ascending/descending stairs, walking, bending, lifting, catching, throwing, pushing, pulling, jumping.)  Direct, one on one contact, billed in 15-minute increments.    TREATMENT PLAN   Plan: Cont POC- Continue emphasis/focus on exercise progression, modulating pain, promoting relaxation, increasing ROM, reduce/eliminate soft tissue swelling/inflammation/restriction, and improving soft tissue extensibility. Next visit plan to add new exercises and continue max protect phase    4x/week for 6 weeks      Electronically Signed by Alba Gutierrez, PTA    Date: 01/30/2024     Note: If patient does not return for scheduled/recommended follow up visits, this note will serve as a discharge from care along with the most recent update on progress.

## 2024-02-01 ENCOUNTER — APPOINTMENT (OUTPATIENT)
Dept: PHYSICAL THERAPY | Age: 44
End: 2024-02-01
Payer: COMMERCIAL

## 2024-02-01 DIAGNOSIS — M24.661 ARTHROFIBROSIS OF KNEE JOINT, RIGHT: Primary | ICD-10-CM

## 2024-02-01 RX ORDER — CELECOXIB 200 MG/1
200 CAPSULE ORAL 2 TIMES DAILY
Qty: 60 CAPSULE | Refills: 0 | Status: SHIPPED | OUTPATIENT
Start: 2024-02-01 | End: 2024-03-02

## 2024-02-02 ENCOUNTER — HOSPITAL ENCOUNTER (OUTPATIENT)
Dept: PHYSICAL THERAPY | Age: 44
Setting detail: THERAPIES SERIES
Discharge: HOME | End: 2024-02-02
Payer: COMMERCIAL

## 2024-02-02 PROCEDURE — 97140 MANUAL THERAPY 1/> REGIONS: CPT | Performed by: PHYSICAL THERAPIST

## 2024-02-02 PROCEDURE — 97110 THERAPEUTIC EXERCISES: CPT | Performed by: PHYSICAL THERAPIST

## 2024-02-02 PROCEDURE — 97112 NEUROMUSCULAR REEDUCATION: CPT | Performed by: PHYSICAL THERAPIST

## 2024-02-02 PROCEDURE — 97016 VASOPNEUMATIC DEVICE THERAPY: CPT | Performed by: PHYSICAL THERAPIST

## 2024-02-02 NOTE — FLOWSHEET NOTE
[facility]- Outpatient Rehabilitation and Therapy [address], [place] [address] office: [phone] fax: [phone]          Physical Therapy: TREATMENT/PROGRESS NOTE   Patient: Kris Elizalde (43 y.o. female)   Treatment Date: 2024   :  1980 MRN: [id number]   Visit #: 15   Insurance Allowable Auth Needed   25 []Yes    [x]No    Insurance: Payor: BCBS / Plan: BCBS OUT OF STATE / Product Type: *No Product type* / $0 copay/25 vpcy/no auth  Insurance ID: [id number] - ([place] BCBS)  Secondary Insurance (if applicable):    Treatment Diagnosis:   1. Right knee pain, unspecified chronicity  M25.561         2. Arthrofibrosis of knee joint, right  M24.661         3. Edema of knee  M25.469         4. Gait abnormality  R26.9         5. Decreased strength of lower extremity  R29.898          Medical Diagnosis:    M24.661 Anklyosis, right knee  Right knee DANITZA/SAURABH 1/10/24   Referring Physician: Pablo Shipley PCP: No primary care provider on file.                             Plan of care signed (Y/N):     Date of Patient follow up with Physician:      Progress Report/POC: Date Range for this report: 10/26/23-24  POC update due: 2024 (OR 10 visits /OR AUTH LIMITS, whichever is less)    Patient seen in consultation with Dr. Shipley who established the initial/subsequent treatment protocol.   24: seen by Frank- d/c gabapentin secondary to nausea/dizziness.  Begin MDP.  Contact Dr Rivera for early consult.  24: ok to remove bandage and sutures, will monitor swelling  24: seen by Dr Shipley-begin 10mg prednisone. Pt seeing Dr Rivera on Thursday.  Continue focus on increased flexion ROM.  Consider SAURABH in 2 weeks if ROM is not progressing well.    Preferred Language for Healthcare:   [x]English       []other:    SUBJECTIVE EXAMINATION     Patient Report/Comments:  3 weeks s/p right DANITZA/SAURABH.  Tolerating HEP well.  Reports compliance with ERMI 5-6x/day.  Reports that her

## 2024-02-05 ENCOUNTER — HOSPITAL ENCOUNTER (OUTPATIENT)
Dept: PHYSICAL THERAPY | Age: 44
Setting detail: THERAPIES SERIES
Discharge: HOME | End: 2024-02-05
Payer: COMMERCIAL

## 2024-02-05 PROCEDURE — 97110 THERAPEUTIC EXERCISES: CPT | Performed by: PHYSICAL THERAPIST

## 2024-02-05 PROCEDURE — 97140 MANUAL THERAPY 1/> REGIONS: CPT | Performed by: PHYSICAL THERAPIST

## 2024-02-05 PROCEDURE — 97016 VASOPNEUMATIC DEVICE THERAPY: CPT | Performed by: PHYSICAL THERAPIST

## 2024-02-05 PROCEDURE — 97112 NEUROMUSCULAR REEDUCATION: CPT | Performed by: PHYSICAL THERAPIST

## 2024-02-05 NOTE — FLOWSHEET NOTE
Quadricep Sets  - 5-6 x daily - 7 x weekly - 1 sets - 10 reps - 10 hold  - Seated Knee Flexion AAROM  - 2 x daily - 7 x weekly - 1 sets - 10 reps - 10 hold  - Ice  - 5-6 x daily - 7 x weekly - 15 minutes hold      ASSESSMENT     Today's Assessment: Tolerated treatment well today. Requires slow steady PROM initially to create relaxation.  Able to reach full extension after stretching.  Maintaining good PFJ mobility.  Constant edema which does increase with activity/standing.  Quad control is slowly progressing although still with moderate deficit. Slow progression of flexion ROM noted.    See Eval and POC/PROGRESS UPDATE: Pt. continues to present with functional deficits in ROM, joint mobility, strength symmetry, and endurance of strength  limiting ability with walking on even ground, walking up/down stairs, transitions between positions, ADLs, and light home activity .  During therapy this date, patient required verbal cueing for improving proper muscle recruitment and activation/motor control patterns and increasing ROM. Patient will continue to benefit from ongoing evaluation and advanced clinical decision from a Physical Therapist to improve pain control, ROM, muscle strength, neuromuscular control, and normalization of gait to safely return to PLOF without symptoms or restrictions.      Medical Necessity Documentation:  I certify that this patient meets the below criteria necessary for medical necessity for care and/or justification of therapy services:  The patient has a musculoskeletal condition(s) with a corresponding ICD-10 code that is of complexity and severity that require skilled therapeutic intervention. This has a direct and significant impact on the need for therapy and significantly impacts the rate of recovery.     Treatment/Activity Tolerance:  [x] Patient tolerated treatment well [] Patient limited by fatique  [] Patient limited by pain  [] Patient limited by other medical complications  [] Other:

## 2024-02-06 ENCOUNTER — PREP FOR PROCEDURE (OUTPATIENT)
Dept: ORTHOPEDIC SURGERY | Age: 44
End: 2024-02-06

## 2024-02-06 ENCOUNTER — HOSPITAL ENCOUNTER (OUTPATIENT)
Dept: PHYSICAL THERAPY | Age: 44
Setting detail: THERAPIES SERIES
Discharge: HOME | End: 2024-02-06
Payer: COMMERCIAL

## 2024-02-06 DIAGNOSIS — M24.661 ARTHROFIBROSIS OF KNEE JOINT, RIGHT: ICD-10-CM

## 2024-02-06 PROCEDURE — 97112 NEUROMUSCULAR REEDUCATION: CPT | Performed by: PHYSICAL THERAPIST

## 2024-02-06 PROCEDURE — 97140 MANUAL THERAPY 1/> REGIONS: CPT | Performed by: PHYSICAL THERAPIST

## 2024-02-06 PROCEDURE — 97110 THERAPEUTIC EXERCISES: CPT | Performed by: PHYSICAL THERAPIST

## 2024-02-06 PROCEDURE — 97016 VASOPNEUMATIC DEVICE THERAPY: CPT | Performed by: PHYSICAL THERAPIST

## 2024-02-06 RX ORDER — SODIUM CHLORIDE 9 MG/ML
INJECTION, SOLUTION INTRAVENOUS PRN
Status: CANCELLED | OUTPATIENT
Start: 2024-02-06

## 2024-02-06 RX ORDER — KETOROLAC TROMETHAMINE 30 MG/ML
30 INJECTION, SOLUTION INTRAMUSCULAR; INTRAVENOUS ONCE
Status: CANCELLED | OUTPATIENT
Start: 2024-02-06 | End: 2024-02-06

## 2024-02-06 RX ORDER — SODIUM CHLORIDE 0.9 % (FLUSH) 0.9 %
5-40 SYRINGE (ML) INJECTION PRN
Status: CANCELLED | OUTPATIENT
Start: 2024-02-06

## 2024-02-06 RX ORDER — PREGABALIN 25 MG/1
75 CAPSULE ORAL ONCE
Status: CANCELLED | OUTPATIENT
Start: 2024-02-06 | End: 2024-02-06

## 2024-02-06 RX ORDER — SODIUM CHLORIDE 0.9 % (FLUSH) 0.9 %
5-40 SYRINGE (ML) INJECTION EVERY 12 HOURS SCHEDULED
Status: CANCELLED | OUTPATIENT
Start: 2024-02-06

## 2024-02-06 NOTE — FLOWSHEET NOTE
[facility]- Outpatient Rehabilitation and Therapy [address], [place] [address] office: [phone] fax: [phone]          Physical Therapy: TREATMENT/PROGRESS NOTE   Patient: Kris Elizalde (43 y.o. female)   Treatment Date: 2024   :  1980 MRN: [id number]   Visit #: 17   Insurance Allowable Auth Needed   25 []Yes    [x]No    Insurance: Payor: BCBS / Plan: BCBS OUT OF STATE / Product Type: *No Product type* / $0 copay/25 vpcy/no auth  Insurance ID: [id number] - ([place] [employer])  Secondary Insurance (if applicable):    Treatment Diagnosis:   1. Right knee pain, unspecified chronicity  M25.561         2. Arthrofibrosis of knee joint, right  M24.661         3. Edema of knee  M25.469         4. Gait abnormality  R26.9         5. Decreased strength of lower extremity  R29.898          Medical Diagnosis:    M24.661 Anklyosis, right knee  Right knee DANITZA/SAURABH 1/10/24   Referring Physician: Pablo Shipley PCP: No primary care provider on file.                             Plan of care signed (Y/N):     Date of Patient follow up with Physician:      Progress Report/POC: Date Range for this report: 10/26/23-24  POC update due: 2024 (OR 10 visits /OR AUTH LIMITS, whichever is less)    Patient seen in consultation with Dr. Shipley who established the initial/subsequent treatment protocol.   24: seen by Frank- d/c gabapentin secondary to nausea/dizziness.  Begin MDP.  Contact Dr Rivera for early consult.  24: ok to remove bandage and sutures, will monitor swelling  24: seen by Dr Shipley-begin 10mg prednisone. Pt seeing Dr Rivera on Thursday.  Continue focus on increased flexion ROM.  Consider SAURABH in 2 weeks if ROM is not progressing well.  24: seen by Frank-planning to schedule SAURABH for next week.  Pt to be placed on a lower dose of Celebrex.    Preferred Language for Healthcare:   [x]English       []other:    SUBJECTIVE EXAMINATION     Patient Report/Comments:  4

## 2024-02-07 ENCOUNTER — APPOINTMENT (OUTPATIENT)
Dept: PHYSICAL THERAPY | Age: 44
End: 2024-02-07
Payer: COMMERCIAL

## 2024-02-08 ENCOUNTER — TELEPHONE (OUTPATIENT)
Dept: ORTHOPEDIC SURGERY | Age: 44
End: 2024-02-08

## 2024-02-08 DIAGNOSIS — M25.561 PAIN AND SWELLING OF RIGHT KNEE: ICD-10-CM

## 2024-02-08 DIAGNOSIS — M24.661 ARTHROFIBROSIS OF KNEE JOINT, RIGHT: Primary | ICD-10-CM

## 2024-02-08 DIAGNOSIS — M25.461 PAIN AND SWELLING OF RIGHT KNEE: ICD-10-CM

## 2024-02-08 RX ORDER — PREDNISONE 5 MG/1
5 TABLET ORAL 2 TIMES DAILY
Qty: 42 TABLET | Refills: 0 | Status: SHIPPED | OUTPATIENT
Start: 2024-02-08 | End: 2024-02-29

## 2024-02-08 NOTE — TELEPHONE ENCOUNTER
Auth: NPR  Date: 02/12/24  Reference # [id number]  Spoke with: Mary  Type of SX: Outpatient  Location: [facility]  CPT: 72795   DX: M24.661  SX area: Rt knee  Insurance: Michele

## 2024-02-09 ENCOUNTER — ANESTHESIA EVENT (OUTPATIENT)
Dept: POSTOP/PACU | Age: 44
End: 2024-02-09
Payer: COMMERCIAL

## 2024-02-09 ENCOUNTER — HOSPITAL ENCOUNTER (OUTPATIENT)
Dept: PHYSICAL THERAPY | Age: 44
Setting detail: THERAPIES SERIES
Discharge: HOME | End: 2024-02-09
Payer: COMMERCIAL

## 2024-02-09 PROCEDURE — 97016 VASOPNEUMATIC DEVICE THERAPY: CPT | Performed by: PHYSICAL THERAPIST

## 2024-02-09 PROCEDURE — 97140 MANUAL THERAPY 1/> REGIONS: CPT | Performed by: PHYSICAL THERAPIST

## 2024-02-09 PROCEDURE — 97112 NEUROMUSCULAR REEDUCATION: CPT | Performed by: PHYSICAL THERAPIST

## 2024-02-09 PROCEDURE — 97110 THERAPEUTIC EXERCISES: CPT | Performed by: PHYSICAL THERAPIST

## 2024-02-09 NOTE — FLOWSHEET NOTE
Status: [] Progressing: [] Met: [] Not Met: [] Adjusted    Overall Progression Towards Functional goals/ Treatment Progress Update:  [] Patient is progressing as expected towards functional goals listed.    [] Progression is slowed due to complexities/Impairments listed.  [] Progression has been slowed due to co-morbidities.  [x] Plan just implemented, too soon (<30days) to assess goals progression   [] Goals require adjustment due to lack of progress  [] Patient is not progressing as expected and requires additional follow up with physician  [] Other:     CHARGE CAPTURE     CHARGE GRID   CPT Code (TIMED) minutes # CPT Code (UNTIMED) #     [x] Therex (16309)   1  [] EVAL:LOW (36686 - Typically 20 minutes face-to-face)      Neuromusc. Re-ed (93977)  1  [] Re-Eval (49063)     [x] Manual (02135)  1  [] Estim Unattended (52919)     [] Ther. Act (12542)    [] Mech. Traction (19578)     [] Gait (70420)    [] Dry Needle 1-2 muscle (20560)     [] Aquatic Therex (82263)    [] Dry Needle 3+ muscle (20561)     [] Iontophoresis (45931)    [x] VASO (80348) 1    [] Ultrasound (26116)    [] Group Therapy (58333)     [] Estim Attended (59140)    [] Other:    Total Timed Code Tx Minutes 51'        Total Treatment Minutes 9:20-11:15  115'        Charge Justification:  (44336) THERAPEUTIC EXERCISE - Provided verbal/tactile cueing for activities related to strengthening, flexibility, endurance, ROM performed to prevent loss of range of motion, maintain or improve muscular strength or increase flexibility, following either an injury or surgery.   (22955) HOME EXERCISE PROGRAM - Reviewed/Progressed HEP activities related to strengthening, flexibility, endurance, ROM performed to prevent loss of range of motion, maintain or improve muscular strength or increase flexibility, following either an injury or surgery.  (38529) NEUROMUSCULAR RE-EDUCATION -

## 2024-02-12 ENCOUNTER — HOSPITAL ENCOUNTER (OUTPATIENT)
Age: 44
Setting detail: OUTPATIENT SURGERY
Discharge: HOME | End: 2024-02-12
Attending: ORTHOPAEDIC SURGERY | Admitting: ORTHOPAEDIC SURGERY
Payer: COMMERCIAL

## 2024-02-12 ENCOUNTER — ANESTHESIA EVENT (OUTPATIENT)
Dept: POSTOP/PACU | Age: 44
End: 2024-02-12
Payer: COMMERCIAL

## 2024-02-12 ENCOUNTER — ANESTHESIA EVENT (OUTPATIENT)
Dept: OPERATING ROOM | Age: 44
End: 2024-02-12
Payer: COMMERCIAL

## 2024-02-12 ENCOUNTER — ANESTHESIA (OUTPATIENT)
Dept: OPERATING ROOM | Age: 44
End: 2024-02-12
Payer: COMMERCIAL

## 2024-02-12 ENCOUNTER — TELEPHONE (OUTPATIENT)
Dept: ORTHOPEDIC SURGERY | Age: 44
End: 2024-02-12

## 2024-02-12 ENCOUNTER — HOSPITAL ENCOUNTER (OUTPATIENT)
Dept: PHYSICAL THERAPY | Age: 44
Setting detail: THERAPIES SERIES
End: 2024-02-12
Payer: COMMERCIAL

## 2024-02-12 ENCOUNTER — HOSPITAL ENCOUNTER (OUTPATIENT)
Dept: POSTOP/PACU | Age: 44
Discharge: HOME | End: 2024-02-12
Payer: COMMERCIAL

## 2024-02-12 ENCOUNTER — ANESTHESIA (OUTPATIENT)
Dept: POSTOP/PACU | Age: 44
End: 2024-02-12
Payer: COMMERCIAL

## 2024-02-12 ENCOUNTER — HOSPITAL ENCOUNTER (OUTPATIENT)
Dept: POSTOP/PACU | Age: 44
Setting detail: OUTPATIENT SURGERY
Discharge: HOME | End: 2024-02-12
Attending: ORTHOPAEDIC SURGERY | Admitting: ORTHOPAEDIC SURGERY
Payer: COMMERCIAL

## 2024-02-12 ENCOUNTER — CLINICAL DOCUMENTATION (OUTPATIENT)
Dept: ORTHOPEDIC SURGERY | Age: 44
End: 2024-02-12

## 2024-02-12 VITALS
RESPIRATION RATE: 16 BRPM | WEIGHT: 206.2 LBS | TEMPERATURE: 97 F | OXYGEN SATURATION: 98 % | HEART RATE: 86 BPM | DIASTOLIC BLOOD PRESSURE: 65 MMHG | HEIGHT: 67 IN | BODY MASS INDEX: 32.36 KG/M2 | SYSTOLIC BLOOD PRESSURE: 110 MMHG

## 2024-02-12 VITALS
TEMPERATURE: 97.9 F | DIASTOLIC BLOOD PRESSURE: 85 MMHG | OXYGEN SATURATION: 96 % | HEIGHT: 67 IN | RESPIRATION RATE: 18 BRPM | HEART RATE: 93 BPM | SYSTOLIC BLOOD PRESSURE: 128 MMHG | BODY MASS INDEX: 32.36 KG/M2 | WEIGHT: 206.2 LBS

## 2024-02-12 VITALS
OXYGEN SATURATION: 100 % | DIASTOLIC BLOOD PRESSURE: 67 MMHG | HEART RATE: 69 BPM | RESPIRATION RATE: 21 BRPM | SYSTOLIC BLOOD PRESSURE: 118 MMHG | TEMPERATURE: 97.6 F

## 2024-02-12 DIAGNOSIS — M24.661 ARTHROFIBROSIS OF KNEE JOINT, RIGHT: Primary | ICD-10-CM

## 2024-02-12 PROCEDURE — 7100000001 HC PACU RECOVERY - ADDTL 15 MIN: Performed by: ORTHOPAEDIC SURGERY

## 2024-02-12 PROCEDURE — 3600000014 HC SURGERY LEVEL 4 ADDTL 15MIN: Performed by: ORTHOPAEDIC SURGERY

## 2024-02-12 PROCEDURE — 2580000003 HC RX 258: Performed by: ANESTHESIOLOGY

## 2024-02-12 PROCEDURE — 7100000001 HC PACU RECOVERY - ADDTL 15 MIN: Performed by: ANESTHESIOLOGY

## 2024-02-12 PROCEDURE — 2709999900 HC NON-CHARGEABLE SUPPLY: Performed by: ORTHOPAEDIC SURGERY

## 2024-02-12 PROCEDURE — 6360000002 HC RX W HCPCS: Performed by: NURSE ANESTHETIST, CERTIFIED REGISTERED

## 2024-02-12 PROCEDURE — 6360000002 HC RX W HCPCS: Performed by: ANESTHESIOLOGY

## 2024-02-12 PROCEDURE — 2500000003 HC RX 250 WO HCPCS: Performed by: ORTHOPAEDIC SURGERY

## 2024-02-12 PROCEDURE — 2720000010 HC SURG SUPPLY STERILE: Performed by: ORTHOPAEDIC SURGERY

## 2024-02-12 PROCEDURE — 64447 NJX AA&/STRD FEMORAL NRV IMG: CPT | Performed by: ANESTHESIOLOGY

## 2024-02-12 PROCEDURE — 3700000000 HC ANESTHESIA ATTENDED CARE: Performed by: ANESTHESIOLOGY

## 2024-02-12 PROCEDURE — 6360000002 HC RX W HCPCS

## 2024-02-12 PROCEDURE — 7100000010 HC PHASE II RECOVERY - FIRST 15 MIN: Performed by: ANESTHESIOLOGY

## 2024-02-12 PROCEDURE — 7100000011 HC PHASE II RECOVERY - ADDTL 15 MIN: Performed by: ORTHOPAEDIC SURGERY

## 2024-02-12 PROCEDURE — 3700000001 HC ADD 15 MINUTES (ANESTHESIA): Performed by: ANESTHESIOLOGY

## 2024-02-12 PROCEDURE — 2500000003 HC RX 250 WO HCPCS: Performed by: NURSE ANESTHETIST, CERTIFIED REGISTERED

## 2024-02-12 PROCEDURE — 3600000004 HC SURGERY LEVEL 4 BASE: Performed by: ORTHOPAEDIC SURGERY

## 2024-02-12 PROCEDURE — 7100000000 HC PACU RECOVERY - FIRST 15 MIN: Performed by: ANESTHESIOLOGY

## 2024-02-12 PROCEDURE — 7100000010 HC PHASE II RECOVERY - FIRST 15 MIN: Performed by: ORTHOPAEDIC SURGERY

## 2024-02-12 PROCEDURE — 3700000001 HC ADD 15 MINUTES (ANESTHESIA): Performed by: ORTHOPAEDIC SURGERY

## 2024-02-12 PROCEDURE — 3700000000 HC ANESTHESIA ATTENDED CARE: Performed by: ORTHOPAEDIC SURGERY

## 2024-02-12 PROCEDURE — 7100000000 HC PACU RECOVERY - FIRST 15 MIN: Performed by: ORTHOPAEDIC SURGERY

## 2024-02-12 PROCEDURE — 6370000000 HC RX 637 (ALT 250 FOR IP): Performed by: ANESTHESIOLOGY

## 2024-02-12 PROCEDURE — 3600000500 HC JOINT MANIPULATION: Performed by: ANESTHESIOLOGY

## 2024-02-12 PROCEDURE — 7100000011 HC PHASE II RECOVERY - ADDTL 15 MIN: Performed by: ANESTHESIOLOGY

## 2024-02-12 RX ORDER — LABETALOL HYDROCHLORIDE 5 MG/ML
10 INJECTION, SOLUTION INTRAVENOUS
Status: CANCELLED | OUTPATIENT
Start: 2024-02-12

## 2024-02-12 RX ORDER — SODIUM CHLORIDE 9 MG/ML
INJECTION, SOLUTION INTRAVENOUS PRN
Status: CANCELLED | OUTPATIENT
Start: 2024-02-12

## 2024-02-12 RX ORDER — SODIUM CHLORIDE 0.9 % (FLUSH) 0.9 %
5-40 SYRINGE (ML) INJECTION PRN
Status: DISCONTINUED | OUTPATIENT
Start: 2024-02-12 | End: 2024-02-13 | Stop reason: HOSPADM

## 2024-02-12 RX ORDER — IPRATROPIUM BROMIDE AND ALBUTEROL SULFATE 2.5; .5 MG/3ML; MG/3ML
1 SOLUTION RESPIRATORY (INHALATION)
Status: DISCONTINUED | OUTPATIENT
Start: 2024-02-12 | End: 2024-02-12 | Stop reason: HOSPADM

## 2024-02-12 RX ORDER — ROPIVACAINE HYDROCHLORIDE 5 MG/ML
INJECTION, SOLUTION EPIDURAL; INFILTRATION; PERINEURAL
Status: COMPLETED | OUTPATIENT
Start: 2024-02-12 | End: 2024-02-12

## 2024-02-12 RX ORDER — SODIUM CHLORIDE 0.9 % (FLUSH) 0.9 %
5-40 SYRINGE (ML) INJECTION EVERY 12 HOURS SCHEDULED
Status: DISCONTINUED | OUTPATIENT
Start: 2024-02-12 | End: 2024-02-12 | Stop reason: HOSPADM

## 2024-02-12 RX ORDER — FENTANYL CITRATE 50 UG/ML
25 INJECTION, SOLUTION INTRAMUSCULAR; INTRAVENOUS EVERY 5 MIN PRN
Status: DISCONTINUED | OUTPATIENT
Start: 2024-02-12 | End: 2024-02-12 | Stop reason: HOSPADM

## 2024-02-12 RX ORDER — LIDOCAINE HYDROCHLORIDE 20 MG/ML
INJECTION, SOLUTION INFILTRATION; PERINEURAL PRN
Status: DISCONTINUED | OUTPATIENT
Start: 2024-02-12 | End: 2024-02-12 | Stop reason: SDUPTHER

## 2024-02-12 RX ORDER — SODIUM CHLORIDE 0.9 % (FLUSH) 0.9 %
5-40 SYRINGE (ML) INJECTION EVERY 12 HOURS SCHEDULED
Status: DISCONTINUED | OUTPATIENT
Start: 2024-02-12 | End: 2024-02-13 | Stop reason: HOSPADM

## 2024-02-12 RX ORDER — FOLIC ACID 1 MG/1
TABLET ORAL
COMMUNITY
Start: 2024-01-29

## 2024-02-12 RX ORDER — ROCURONIUM BROMIDE 10 MG/ML
INJECTION, SOLUTION INTRAVENOUS PRN
Status: DISCONTINUED | OUTPATIENT
Start: 2024-02-12 | End: 2024-02-12 | Stop reason: SDUPTHER

## 2024-02-12 RX ORDER — TRAMADOL HYDROCHLORIDE 50 MG/1
50 TABLET ORAL ONCE
Status: COMPLETED | OUTPATIENT
Start: 2024-02-12 | End: 2024-02-12

## 2024-02-12 RX ORDER — KETOROLAC TROMETHAMINE 30 MG/ML
30 INJECTION, SOLUTION INTRAMUSCULAR; INTRAVENOUS ONCE
Status: COMPLETED | OUTPATIENT
Start: 2024-02-12 | End: 2024-02-12

## 2024-02-12 RX ORDER — LABETALOL HYDROCHLORIDE 5 MG/ML
10 INJECTION, SOLUTION INTRAVENOUS
Status: DISCONTINUED | OUTPATIENT
Start: 2024-02-12 | End: 2024-02-12 | Stop reason: SDUPTHER

## 2024-02-12 RX ORDER — FENTANYL CITRATE 50 UG/ML
INJECTION, SOLUTION INTRAMUSCULAR; INTRAVENOUS
Status: COMPLETED
Start: 2024-02-12 | End: 2024-02-12

## 2024-02-12 RX ORDER — PROMETHAZINE HYDROCHLORIDE 25 MG/ML
6.25 INJECTION, SOLUTION INTRAMUSCULAR; INTRAVENOUS ONCE
Status: DISCONTINUED | OUTPATIENT
Start: 2024-02-12 | End: 2024-02-12

## 2024-02-12 RX ORDER — SODIUM CHLORIDE 9 MG/ML
INJECTION, SOLUTION INTRAVENOUS PRN
Status: DISCONTINUED | OUTPATIENT
Start: 2024-02-12 | End: 2024-02-12 | Stop reason: HOSPADM

## 2024-02-12 RX ORDER — SODIUM CHLORIDE, SODIUM LACTATE, POTASSIUM CHLORIDE, AND CALCIUM CHLORIDE .6; .31; .03; .02 G/100ML; G/100ML; G/100ML; G/100ML
IRRIGANT IRRIGATION PRN
Status: DISCONTINUED | OUTPATIENT
Start: 2024-02-12 | End: 2024-02-12 | Stop reason: ALTCHOICE

## 2024-02-12 RX ORDER — SODIUM CHLORIDE, SODIUM LACTATE, POTASSIUM CHLORIDE, CALCIUM CHLORIDE 600; 310; 30; 20 MG/100ML; MG/100ML; MG/100ML; MG/100ML
INJECTION, SOLUTION INTRAVENOUS CONTINUOUS
Status: DISCONTINUED | OUTPATIENT
Start: 2024-02-12 | End: 2024-02-13 | Stop reason: HOSPADM

## 2024-02-12 RX ORDER — HYDRALAZINE HYDROCHLORIDE 20 MG/ML
10 INJECTION INTRAMUSCULAR; INTRAVENOUS
Status: DISCONTINUED | OUTPATIENT
Start: 2024-02-12 | End: 2024-02-13 | Stop reason: HOSPADM

## 2024-02-12 RX ORDER — HYDROMORPHONE HYDROCHLORIDE 1 MG/ML
0.5 INJECTION, SOLUTION INTRAMUSCULAR; INTRAVENOUS; SUBCUTANEOUS EVERY 5 MIN PRN
Status: CANCELLED | OUTPATIENT
Start: 2024-02-12

## 2024-02-12 RX ORDER — SODIUM CHLORIDE 0.9 % (FLUSH) 0.9 %
5-40 SYRINGE (ML) INJECTION PRN
Status: DISCONTINUED | OUTPATIENT
Start: 2024-02-12 | End: 2024-02-12 | Stop reason: HOSPADM

## 2024-02-12 RX ORDER — SODIUM CHLORIDE 0.9 % (FLUSH) 0.9 %
5-40 SYRINGE (ML) INJECTION EVERY 12 HOURS SCHEDULED
Status: CANCELLED | OUTPATIENT
Start: 2024-02-12

## 2024-02-12 RX ORDER — PROCHLORPERAZINE EDISYLATE 5 MG/ML
INJECTION INTRAMUSCULAR; INTRAVENOUS
Status: DISCONTINUED
Start: 2024-02-12 | End: 2024-02-13 | Stop reason: HOSPADM

## 2024-02-12 RX ORDER — KETOROLAC TROMETHAMINE 30 MG/ML
INJECTION, SOLUTION INTRAMUSCULAR; INTRAVENOUS
Status: DISCONTINUED
Start: 2024-02-12 | End: 2024-02-13 | Stop reason: HOSPADM

## 2024-02-12 RX ORDER — SENNOSIDES 8.6 MG
1 TABLET ORAL DAILY
Qty: 30 TABLET | Refills: 0 | Status: SHIPPED | OUTPATIENT
Start: 2024-02-12

## 2024-02-12 RX ORDER — MIDAZOLAM HYDROCHLORIDE 1 MG/ML
INJECTION INTRAMUSCULAR; INTRAVENOUS
Status: COMPLETED
Start: 2024-02-12 | End: 2024-02-12

## 2024-02-12 RX ORDER — TRAMADOL HYDROCHLORIDE 50 MG/1
50 TABLET ORAL EVERY 6 HOURS PRN
Qty: 28 TABLET | Refills: 0 | Status: SHIPPED | OUTPATIENT
Start: 2024-02-12 | End: 2024-02-19

## 2024-02-12 RX ORDER — LABETALOL HYDROCHLORIDE 5 MG/ML
10 INJECTION, SOLUTION INTRAVENOUS
Status: DISCONTINUED | OUTPATIENT
Start: 2024-02-12 | End: 2024-02-12 | Stop reason: HOSPADM

## 2024-02-12 RX ORDER — HYDROMORPHONE HYDROCHLORIDE 1 MG/ML
0.5 INJECTION, SOLUTION INTRAMUSCULAR; INTRAVENOUS; SUBCUTANEOUS EVERY 5 MIN PRN
Status: DISCONTINUED | OUTPATIENT
Start: 2024-02-12 | End: 2024-02-12 | Stop reason: HOSPADM

## 2024-02-12 RX ORDER — FENTANYL CITRATE 50 UG/ML
INJECTION, SOLUTION INTRAMUSCULAR; INTRAVENOUS PRN
Status: DISCONTINUED | OUTPATIENT
Start: 2024-02-12 | End: 2024-02-12 | Stop reason: SDUPTHER

## 2024-02-12 RX ORDER — ROPIVACAINE HYDROCHLORIDE 5 MG/ML
INJECTION, SOLUTION EPIDURAL; INFILTRATION; PERINEURAL
Status: COMPLETED
Start: 2024-02-12 | End: 2024-02-12

## 2024-02-12 RX ORDER — PROPOFOL 10 MG/ML
INJECTION, EMULSION INTRAVENOUS PRN
Status: DISCONTINUED | OUTPATIENT
Start: 2024-02-12 | End: 2024-02-12 | Stop reason: SDUPTHER

## 2024-02-12 RX ORDER — PROCHLORPERAZINE EDISYLATE 5 MG/ML
5 INJECTION INTRAMUSCULAR; INTRAVENOUS
Status: DISCONTINUED | OUTPATIENT
Start: 2024-02-12 | End: 2024-02-12 | Stop reason: SDUPTHER

## 2024-02-12 RX ORDER — PROCHLORPERAZINE EDISYLATE 5 MG/ML
5 INJECTION INTRAMUSCULAR; INTRAVENOUS
Status: CANCELLED | OUTPATIENT
Start: 2024-02-12 | End: 2024-02-13

## 2024-02-12 RX ORDER — MIDAZOLAM HYDROCHLORIDE 1 MG/ML
INJECTION INTRAMUSCULAR; INTRAVENOUS PRN
Status: DISCONTINUED | OUTPATIENT
Start: 2024-02-12 | End: 2024-02-12 | Stop reason: SDUPTHER

## 2024-02-12 RX ORDER — MEPERIDINE HYDROCHLORIDE 25 MG/ML
12.5 INJECTION INTRAMUSCULAR; INTRAVENOUS; SUBCUTANEOUS EVERY 5 MIN PRN
Status: DISCONTINUED | OUTPATIENT
Start: 2024-02-12 | End: 2024-02-13 | Stop reason: HOSPADM

## 2024-02-12 RX ORDER — ROPIVACAINE HYDROCHLORIDE 5 MG/ML
INJECTION, SOLUTION EPIDURAL; INFILTRATION; PERINEURAL
Status: DISCONTINUED
Start: 2024-02-12 | End: 2024-02-12 | Stop reason: HOSPADM

## 2024-02-12 RX ORDER — SODIUM CHLORIDE 9 MG/ML
INJECTION, SOLUTION INTRAVENOUS PRN
Status: DISCONTINUED | OUTPATIENT
Start: 2024-02-12 | End: 2024-02-13 | Stop reason: HOSPADM

## 2024-02-12 RX ORDER — CEFAZOLIN SODIUM 1 G/3ML
INJECTION, POWDER, FOR SOLUTION INTRAMUSCULAR; INTRAVENOUS PRN
Status: DISCONTINUED | OUTPATIENT
Start: 2024-02-12 | End: 2024-02-12 | Stop reason: SDUPTHER

## 2024-02-12 RX ORDER — IPRATROPIUM BROMIDE AND ALBUTEROL SULFATE 2.5; .5 MG/3ML; MG/3ML
1 SOLUTION RESPIRATORY (INHALATION)
Status: CANCELLED | OUTPATIENT
Start: 2024-02-12 | End: 2024-02-13

## 2024-02-12 RX ORDER — DEXAMETHASONE SODIUM PHOSPHATE 4 MG/ML
INJECTION, SOLUTION INTRA-ARTICULAR; INTRALESIONAL; INTRAMUSCULAR; INTRAVENOUS; SOFT TISSUE PRN
Status: DISCONTINUED | OUTPATIENT
Start: 2024-02-12 | End: 2024-02-12 | Stop reason: SDUPTHER

## 2024-02-12 RX ORDER — HYDROMORPHONE HYDROCHLORIDE 1 MG/ML
0.5 INJECTION, SOLUTION INTRAMUSCULAR; INTRAVENOUS; SUBCUTANEOUS EVERY 5 MIN PRN
Status: DISCONTINUED | OUTPATIENT
Start: 2024-02-12 | End: 2024-02-12 | Stop reason: ALTCHOICE

## 2024-02-12 RX ORDER — PROCHLORPERAZINE EDISYLATE 5 MG/ML
5 INJECTION INTRAMUSCULAR; INTRAVENOUS
Status: COMPLETED | OUTPATIENT
Start: 2024-02-12 | End: 2024-02-12

## 2024-02-12 RX ORDER — METOCLOPRAMIDE HYDROCHLORIDE 5 MG/ML
10 INJECTION INTRAMUSCULAR; INTRAVENOUS
Status: DISCONTINUED | OUTPATIENT
Start: 2024-02-12 | End: 2024-02-13 | Stop reason: HOSPADM

## 2024-02-12 RX ORDER — HYDROMORPHONE HYDROCHLORIDE 1 MG/ML
0.5 INJECTION, SOLUTION INTRAMUSCULAR; INTRAVENOUS; SUBCUTANEOUS EVERY 5 MIN PRN
Status: COMPLETED | OUTPATIENT
Start: 2024-02-12 | End: 2024-02-12

## 2024-02-12 RX ORDER — SODIUM CHLORIDE 0.9 % (FLUSH) 0.9 %
5-40 SYRINGE (ML) INJECTION PRN
Status: CANCELLED | OUTPATIENT
Start: 2024-02-12

## 2024-02-12 RX ORDER — FENTANYL CITRATE 50 UG/ML
25 INJECTION, SOLUTION INTRAMUSCULAR; INTRAVENOUS EVERY 5 MIN PRN
Status: CANCELLED | OUTPATIENT
Start: 2024-02-12

## 2024-02-12 RX ADMIN — PROPOFOL 20 MG: 10 INJECTION, EMULSION INTRAVENOUS at 07:49

## 2024-02-12 RX ADMIN — HYDROMORPHONE HYDROCHLORIDE 0.5 MG: 1 INJECTION, SOLUTION INTRAMUSCULAR; INTRAVENOUS; SUBCUTANEOUS at 08:24

## 2024-02-12 RX ADMIN — KETOROLAC TROMETHAMINE 30 MG: 30 INJECTION, SOLUTION INTRAMUSCULAR; INTRAVENOUS at 16:14

## 2024-02-12 RX ADMIN — CEFAZOLIN SODIUM 2 G: 1 POWDER, FOR SOLUTION INTRAMUSCULAR; INTRAVENOUS at 14:04

## 2024-02-12 RX ADMIN — FENTANYL CITRATE 25 MCG: 50 INJECTION, SOLUTION INTRAMUSCULAR; INTRAVENOUS at 07:48

## 2024-02-12 RX ADMIN — HYDROMORPHONE HYDROCHLORIDE 0.5 MG: 1 INJECTION, SOLUTION INTRAMUSCULAR; INTRAVENOUS; SUBCUTANEOUS at 15:37

## 2024-02-12 RX ADMIN — LIDOCAINE HYDROCHLORIDE 100 MG: 20 INJECTION, SOLUTION INFILTRATION; PERINEURAL at 07:44

## 2024-02-12 RX ADMIN — PROPOFOL 40 MG: 10 INJECTION, EMULSION INTRAVENOUS at 07:45

## 2024-02-12 RX ADMIN — SODIUM CHLORIDE, SODIUM LACTATE, POTASSIUM CHLORIDE, AND CALCIUM CHLORIDE: .6; .31; .03; .02 INJECTION, SOLUTION INTRAVENOUS at 14:59

## 2024-02-12 RX ADMIN — PROPOFOL 40 MG: 10 INJECTION, EMULSION INTRAVENOUS at 07:44

## 2024-02-12 RX ADMIN — PROPOFOL 20 MG: 10 INJECTION, EMULSION INTRAVENOUS at 07:48

## 2024-02-12 RX ADMIN — FENTANYL CITRATE 100 MCG: 50 INJECTION, SOLUTION INTRAMUSCULAR; INTRAVENOUS at 13:49

## 2024-02-12 RX ADMIN — PROPOFOL 20 MG: 10 INJECTION, EMULSION INTRAVENOUS at 07:50

## 2024-02-12 RX ADMIN — HYDROMORPHONE HYDROCHLORIDE 0.5 MG: 1 INJECTION, SOLUTION INTRAMUSCULAR; INTRAVENOUS; SUBCUTANEOUS at 08:14

## 2024-02-12 RX ADMIN — PROPOFOL 200 MG: 10 INJECTION, EMULSION INTRAVENOUS at 14:05

## 2024-02-12 RX ADMIN — SUGAMMADEX 200 MG: 100 INJECTION, SOLUTION INTRAVENOUS at 15:14

## 2024-02-12 RX ADMIN — HYDROMORPHONE HYDROCHLORIDE 0.5 MG: 1 INJECTION, SOLUTION INTRAMUSCULAR; INTRAVENOUS; SUBCUTANEOUS at 15:42

## 2024-02-12 RX ADMIN — MIDAZOLAM HYDROCHLORIDE 2 MG: 2 INJECTION, SOLUTION INTRAMUSCULAR; INTRAVENOUS at 07:44

## 2024-02-12 RX ADMIN — PROPOFOL 20 MG: 10 INJECTION, EMULSION INTRAVENOUS at 07:47

## 2024-02-12 RX ADMIN — ROPIVACAINE HYDROCHLORIDE 30 ML: 5 INJECTION, SOLUTION EPIDURAL; INFILTRATION; PERINEURAL at 13:49

## 2024-02-12 RX ADMIN — MIDAZOLAM HYDROCHLORIDE 2 MG: 2 INJECTION, SOLUTION INTRAMUSCULAR; INTRAVENOUS at 13:49

## 2024-02-12 RX ADMIN — PROPOFOL 20 MG: 10 INJECTION, EMULSION INTRAVENOUS at 07:46

## 2024-02-12 RX ADMIN — FENTANYL CITRATE 50 MCG: 50 INJECTION, SOLUTION INTRAMUSCULAR; INTRAVENOUS at 07:51

## 2024-02-12 RX ADMIN — FENTANYL CITRATE 25 MCG: 50 INJECTION, SOLUTION INTRAMUSCULAR; INTRAVENOUS at 07:44

## 2024-02-12 RX ADMIN — SODIUM CHLORIDE, SODIUM LACTATE, POTASSIUM CHLORIDE, AND CALCIUM CHLORIDE: .6; .31; .03; .02 INJECTION, SOLUTION INTRAVENOUS at 07:05

## 2024-02-12 RX ADMIN — TRAMADOL HYDROCHLORIDE 50 MG: 50 TABLET, COATED ORAL at 17:04

## 2024-02-12 RX ADMIN — DEXAMETHASONE SODIUM PHOSPHATE 8 MG: 4 INJECTION, SOLUTION INTRAMUSCULAR; INTRAVENOUS at 14:08

## 2024-02-12 RX ADMIN — PROCHLORPERAZINE EDISYLATE 5 MG: 5 INJECTION INTRAMUSCULAR; INTRAVENOUS at 16:16

## 2024-02-12 RX ADMIN — ROCURONIUM BROMIDE 50 MG: 10 INJECTION, SOLUTION INTRAVENOUS at 14:05

## 2024-02-12 ASSESSMENT — PAIN SCALES - GENERAL
PAINLEVEL_OUTOF10: 6
PAINLEVEL_OUTOF10: 0
PAINLEVEL_OUTOF10: 6
PAINLEVEL_OUTOF10: 0
PAINLEVEL_OUTOF10: 6

## 2024-02-12 ASSESSMENT — PAIN DESCRIPTION - LOCATION
LOCATION: KNEE

## 2024-02-12 ASSESSMENT — PAIN DESCRIPTION - ORIENTATION
ORIENTATION: RIGHT

## 2024-02-12 ASSESSMENT — PAIN DESCRIPTION - PAIN TYPE
TYPE: SURGICAL PAIN

## 2024-02-12 ASSESSMENT — PAIN DESCRIPTION - DESCRIPTORS
DESCRIPTORS: BURNING
DESCRIPTORS: BURNING
DESCRIPTORS: DISCOMFORT;BURNING
DESCRIPTORS: ACHING;BURNING

## 2024-02-12 NOTE — ANESTHESIA PRE PROCEDURE
Department of Anesthesiology  Preprocedure Note       Name:  Kris Elizalde   Age:  43 y.o.  :  1980                                          MRN:  [id number]         Date:  2024      Surgeon: * No surgeons listed *    Procedure: * No procedures listed *    Medications prior to admission:   Prior to Admission medications    Medication Sig Start Date End Date Taking? Authorizing Provider   folic acid (FOLVITE) 1 MG tablet  24  Yes Yunior Liang MD   predniSONE (DELTASONE) 5 MG tablet Take 1 tablet by mouth 2 times daily for 21 days 24  Frank Adams PA-C   celecoxib (CELEBREX) 200 MG capsule Take 1 capsule by mouth 2 times daily 2/1/24 3/2/24  Frank Adams PA-C   aspirin 325 MG EC tablet Take 1 tablet by mouth in the morning and at bedtime 24   Alfredo Chang DO   acetaminophen (TYLENOL) 325 MG tablet Take 2 tablets by mouth every 6 hours 24   Alfredo Chang DO   ascorbic acid (V-R VITAMIN C) 250 MG tablet Take 1,200 mg by mouth daily    Yunior Liang MD   Magnesium Oxide (MAGNESIUM-OXIDE) 250 MG TABS tablet Take by mouth 20   Yunior Liang MD   Methotrexate Sodium, PF, 50 MG/2ML SOLN chemo injection  21   Yunior Liang MD   Naltrexone HCl, Pain, 1.5 MG CAPS  23   Yunior Liang MD   nortriptyline (PAMELOR) 25 MG capsule Take 1 capsule by mouth nightly 23   Yunior Liang MD   Cholecalciferol (VITAMIN D-3) 25 MCG (1000 UT) CAPS Vitamin D-3    Yunior Liang MD   Zinc 10 MG LOZG Zinc    Yunior Liang MD   VITAMIN E PO Take 7 mg by mouth daily    Yunior Liang MD   golimumab (SIMPONI ARIA) 50 MG/4ML SOLN  23   Yunior Liang MD       Current medications:    No current facility-administered medications for this encounter.     Current Outpatient Medications   Medication Sig Dispense Refill   • folic acid (FOLVITE) 1 MG tablet      • predniSONE (DELTASONE) 5 MG tablet Take 1

## 2024-02-12 NOTE — PROGRESS NOTES
I spoke with the [id number] physician and reviewed this patients case. The physician stated that her case is now approved.      Authorization number: [id number]            Frank Connell PA-C    Physician Assistant - Certified  Orthopedic Surgery   [facility]    02/12/24 1:28 PM

## 2024-02-12 NOTE — ANESTHESIA PRE PROCEDURE
Mallampati: II  TM distance: >3 FB   Neck ROM: full  Mouth opening: > = 3 FB   Dental: normal exam         Pulmonary: breath sounds clear to auscultation                             Cardiovascular:  Exercise tolerance: good (>4 METS)          Rhythm: regular  Rate: normal                    Neuro/Psych:   (+) headaches: migraine headaches            GI/Hepatic/Renal:             Endo/Other:                     Abdominal:             Vascular:          Other Findings:         Anesthesia Plan      general and regional     ASA 2       Induction: intravenous.      Anesthetic plan and risks discussed with patient.    Use of blood products discussed with patient whom.    Plan discussed with surgical team and CRNA.                Carlos Rodney MD   2/12/2024

## 2024-02-12 NOTE — DISCHARGE INSTRUCTIONS
PATIENT INSTRUCTIONS FOLLOWING OUTPATIENT   ARTHROSCOPIC KNEE SURGERY    1. Elevate the operated area above heart level and apply ice bag 20 minutes of every hour to operative extremity.  2. Dressing will be removed in the clinic / PT. DO NOT remove sutures, staples or Steri-strips.  3. Use crutches as needed.  4. Toe touch weight bearing with crutches for assistance to operative extremity.  5. Ankle pumps frequently  6. Follow up with previously scheduled appointment or call the office for an appointment if you do not already have one.  7. Call your doctor if:   Your incision becomes red, swollen or develops drainage  If the wound becomes increasingly painful uncontrolled with the pain medications.   If you develop fever greater than 101 degrees after the first 48 hours.  8. Please call with questions/concerns.  9. PT to begin tomorrow 2/13/24. Please call to confirm appt.      Board Certified Orthopaedic Surgeon  President and Medical Director  [facility] and [facility] AMBULATORY PROCEDURE DISCHARGE INSTRUCTIONS    There are potential side effects of anesthesia or sedation you may experience for the first 24 hours.  These side effects include:    Confusion or Memory loss, Dizziness, or Delayed Reaction Times   [x]A responsible person should be with you for the next 24 hours.  Do not operate any vehicles (automobiles, bicycles, motorcycles) or power tools or machinery for 24 hours.  Do not sign any legal documents or make any legal decisions for 24 hours. Do not drink alcohol for 24 hours or while taking narcotic pain medication.      Nausea    [x]Start with light diet and progress to your normal diet as you feel like eating. However, if you experience nausea or repeated episodes of vomiting which persist beyond 12-24 hours, notify your physician.  Once nausea has passed, remember to keep drinking fluids.    Difficulty Passing Urine  [x]Drink extra amounts  [phone] for follow-up appointment and problems    Watch for these possible complications, symptoms, or side effects of anesthesia.  Call physician if they or any other problems occur:  Signs of INFECTION   > Fever over 101°     > Redness, swelling, hardness or warmth at the operative site   >Foul smelling or cloudy drainage at the operative site   Unrelieved PAIN  Unrelieved NAUSEA  Blood soaked dressing.  (Some oozing may be normal)  Inability to urinate      Numb, pale, blue, cold or tingling extremity      Physician:  Dr. Shipley    The above instructions were reviewed with patient/significant other.  The following additional patient specific information was reviewed with the patient/significant other:  [x]Procedure/physician specific instructions  []Medication information sheet(S) including potential side effects  []Yaneli’s egress test  []Pain Ball management  [x]FAQ Catheter associated blood stream infections  [x]FAQ Surgical Site Infections  []Other-    I have read and understand the instructions given to me: ____________________________________________   (Patient/S.O. Signature)            Date/time 2/12/2024 3:47 PM                 If you smoke STOP. We care about your health!

## 2024-02-12 NOTE — H&P
Date:  2024    Name:  Kris Elizalde  Address:  [address] [place] [id number]    :  1980      Age:   43 y.o.      Chief Complaint    Knee Stiffness; Pre-Surgical H&P    History of Present Illness:  Kris Elizalde is a 43 y.o. female who presents for scheduled closed right knee manipulation under anesthesia following open arthrotomy and scar excision with Dr Shipley. No new complaints today. Accompanied by mother at bedside.     The patient denies any new injury.  The patient denies any catching, giving way, joint locking, numbness, paresthesias, or weakness.         Past Medical History:   Diagnosis Date    Arthritis     Blurred vision     Dizziness     Migraine without aura and with status migrainosus, not intractable     Osteoarthritis     Vertigo       Past Surgical History:   Procedure Laterality Date     SECTION      x3    CHOLECYSTECTOMY      DILATION AND CURETTAGE OF UTERUS      HYSTERECTOMY (CERVIX STATUS UNKNOWN)      JOINT REPLACEMENT Right     11/10/2021    KNEE ARTHROTOMY Right 1/10/2024    RIGHT KNEE ANTERIOR ARTHROTOMY WITH ANTERIOR SYNOVECTOMY AND RECONSTRUCTION WITH EXTENSOR REALIGMENT REALIGNMENT WITH MANIPULATION UNDER ANESTHESIA performed by Pablo Shipley MD at [facility] OR    LIPOMA RESECTION Right 2019    arm       History reviewed. No pertinent family history.    Social History     Socioeconomic History    Marital status:      Spouse name: None    Number of children: None    Years of education: None    Highest education level: None   Tobacco Use    Smoking status: Never    Smokeless tobacco: Never   Vaping Use    Vaping Use: Never used   Substance and Sexual Activity    Alcohol use: Not Currently    Drug use: Never     Social Determinants of Health     Food Insecurity: No Food Insecurity (1/10/2024)    Hunger Vital Sign     Worried About Running Out of Food in the Last Year: Never true     Ran Out of Food in the Last Year: Never true   Transportation

## 2024-02-12 NOTE — DISCHARGE INSTRUCTIONS
Manipulation Post-Op Instructions    You underwent closed manipulation under anesthesia. You can weight bear as tolerated on your knee.  Ultram was provided for postoperative pain.  Continue other  medication as previously directed.  Please call the office any questions or concerns.    Jaylan Wheat MD  Orthopedic Surgery Sports Medicine Fellow

## 2024-02-12 NOTE — ANESTHESIA PROCEDURE NOTES
Peripheral Block    Patient location during procedure: pre-op  Reason for block: post-op pain management and at surgeon's request  Start time: 2/12/2024 1:49 PM  End time: 2/12/2024 1:52 PM  Staffing  Performed: resident/CRNA   Anesthesiologist: Carlos Rodney MD  Resident/CRNA: Dave Covarrubias APRN - CRNA  Performed by: Carlos Rodney MD  Authorized by: Carlos Rdoney MD    Preanesthetic Checklist  Completed: patient identified, IV checked, site marked, risks and benefits discussed, surgical/procedural consents, equipment checked, pre-op evaluation, timeout performed, anesthesia consent given, oxygen available, monitors applied/VS acknowledged, fire risk safety assessment completed and verbalized and blood product R/B/A discussed and consented  Peripheral Block   Patient position: supine  Prep: ChloraPrep  Provider prep: mask and sterile gloves  Patient monitoring: cardiac monitor, continuous pulse ox, continuous capnometry, frequent blood pressure checks, responsive to questions, oxygen and IV access  Block type: Femoral  Adductor canal  Laterality: right  Injection technique: single-shot  Guidance: ultrasound guided    Needle   Needle type: insulated echogenic nerve stimulator needle   Needle gauge: 22 G  Needle localization: ultrasound guidance  Needle length: 8 cm  Assessment   Injection assessment: negative aspiration for heme, no paresthesia on injection, local visualized surrounding nerve on ultrasound and no intravascular symptoms  Paresthesia pain: none  Slow fractionated injection: yes  Hemodynamics: stable  Real-time US image taken/store: yes  Outcomes: uncomplicated and patient tolerated procedure well    Additional Notes  0.5% ropivacaine 30 ml, injected in 5 ml increments with negative aspiration between. No complications.  Medications Administered  ropivacaine (NAROPIN) injection 0.5% - Perineural   30 mL - 2/12/2024 1:49:00 PM

## 2024-02-12 NOTE — OP NOTE
[place] [id number]                                OPERATIVE REPORT    PATIENT NAME: FREDY ROMEO                       :        1980  MED REC NO:   [id number]                          ROOM:  ACCOUNT NO:   [id number]                           ADMIT DATE: 2024  PROVIDER:     Pablo Shipley MD    DATE OF PROCEDURE:  2024    PREOPERATIVE DIAGNOSIS:  Arthrofibrosis of right knee, status post total  knee replacement.    POSTOPERATIVE DIAGNOSIS:  Arthrofibrosis of right knee, status post  total knee replacement.    PROCEDURE:  Manipulation under chemical analgesia.    SURGEON:  Pablo Shipley MD    OPERATIVE INDICATIONS:  This patient presented to our center in  [place] for treatment of severe arthrofibrosis.  She did undergo a  successful resection of extensive scar tissue in an open procedure  approximately four weeks ago.  She has gained 20 degrees of knee flexion  proceeding from approximately 70 degrees to 90 degrees, but during this  interval, the portion of scar tissue has re-accumulated and reformed  particularly along the medial aspect of the patella and medial  retinaculum.  The goal of this procedure was to see if a very gentle  manipulation could free up her knee, so that she could gain greater than  90 degrees of knee flexion.  She understood the associated risks,  benefits, and consented to the operative procedure.    DESCRIPTION OF PROCEDURE:  This patient was placed in supine position  upon the recovery room table and after satisfactory level of chemical  analgesia and identification of the signed right limb and the time-out  procedure, a very gentle manipulation was performed.  Patellar  mobilization was not effective in restoring adequate medial lateral  glide.  With flexion to 90 degrees, a hard stop was encountered and it  would be contraindicated to proceed further as this could risk

## 2024-02-12 NOTE — PROGRESS NOTES
Anesthesia Dr. Rodney and RENEE Acosta here to do Right Adductor Canal block.  O2 2L N/C placed.  Start time:1345  Stop time:1553  Tolerated Well    See flow sheet for vital signs.

## 2024-02-12 NOTE — PROGRESS NOTES
Patient in PACU bed #16.   VS stable.   Dr. Shipley and CRNA at bedside.   Right leg- time out completed.

## 2024-02-12 NOTE — FLOWSHEET NOTE
Assumed care of pt from previous RN Lizbeth. Pt resting in bed and denies any pain. Pt was on 4 L and turned down to 2 L NC. Pt informed may need a scope today and wants to get done today as pt is out of town. Will keep pt NPO at this time and call OR RN to see plan of care.

## 2024-02-12 NOTE — PROGRESS NOTES
PACU Transfer to [facility]    Vitals:    02/12/24 0852   BP: 119/72   Pulse: 80   Resp: 16   Temp: 98.4   SpO2: 98%         Intake/Output Summary (Last 24 hours) at 2/12/2024 0854  Last data filed at 2/12/2024 0853  Gross per 24 hour   Intake 1100 ml   Output 0 ml   Net 1100 ml       Pain assessment:  none  Pain Level: 5    Patient transferred to care of [facility] RN.    2/12/2024 8:54 AM

## 2024-02-12 NOTE — PROGRESS NOTES
Patient received from procedural area, patient awake and alert X3 with call light within reach.  Patients mother at bedside.

## 2024-02-12 NOTE — PROGRESS NOTES
Right knee manipulation completed at bedside per Dr. Shipley.   Patient drowsy from medications versed IV given per CRNA.   VS stable.   No complaints of pain or nausea at this time.

## 2024-02-12 NOTE — FLOWSHEET NOTE
Pt c/o pain in R knee. At this time pt already received narcotics. Dr. Olivares called and said ok to medicate.

## 2024-02-12 NOTE — FLOWSHEET NOTE
Pt mother, Flavia called and updated on pt status. Flavia made aware pt is going back to surgery. Pt states pain is improving. Pt states pain as a 5/5 and is tolerable.

## 2024-02-12 NOTE — PROGRESS NOTES
Brought to PACU from OR. Report received from CRNA and OR RN. Placed on pacu monitors. Arousable to name. RLE w/ace, palpable pedal/tibial pulses. Toes warm. Ice packs applied to knee.

## 2024-02-12 NOTE — TELEPHONE ENCOUNTER
I spoke with the [id number] physician and reviewed this patients case. The physician stated that her case is now approved.     [phone]          Frank Connell PA-C    Physician Assistant - Certified  Orthopedic Surgery   [facility]    02/12/24 12:22 PM

## 2024-02-12 NOTE — PROGRESS NOTES
With the manipulation under anesthesia with Dr. Pablo Shipley MD. We were only able to achieve 75 degrees of knee flexion. The patient has a a hard endpoint indicating that her scar tissue has returned. This is known to occur in some arthrofibrosis patients and give the patients physical exam during the manipulation we believe with a reasonable degree of medical certainty that this patient has an indication for a right knee arthroscopy with lysis of adhesions. This was discussed with the patient once she recovered from the anesthetic. She understand and agrees with this plan.         Frank Connell PA-C    Physician Assistant - Certified  Orthopedic Surgery   [facility]    02/12/24 10:32 AM

## 2024-02-12 NOTE — PROGRESS NOTES
1320 Received report from Bhupinder DAY  1330 After registration complete for Right knee Arthroscopy, Lysis of Adhesions procedure,this nurse and BARB Ibarra obtained telephone consent from  per protocol due to pt already sedated from earlier this am with verbalization of understanding of pt. RLE with skin Warm and Dry with +2palp pedal pulse.

## 2024-02-12 NOTE — BRIEF OP NOTE
Brief Postoperative Note      Patient: Kris Elizalde  YOB: 1980  MRN: [id number]    Date of Procedure: 2/12/2024    Pre-Op Diagnosis Codes:     * Fibrosis of right knee joint [M24.661]    Post-Op Diagnosis: Post-Op Diagnosis Codes:     * Fibrosis of right knee joint [M24.661]       Procedure(s):  RIGHT KNEE ARTHROSCOPY, LYSIS OF ADHESIONS, MEDIAL, LATERAL RELEASES    Surgeon(s):  Pablo Shipley MD    Assistant:  Surgical Assistant: Miguel Portillo  Fellow: Jaylan Wheat MD  Physician Assistant: Frank Adams PA-C    Anesthesia: General    Estimated Blood Loss (mL): less than 50     Complications: None    Specimens:   * No specimens in log *    Implants:  * No implants in log *      Drains: * No LDAs found *    Findings: Thickened scar formation on medial and lateral patellar retinaculum      Electronically signed by Jaylan Wheat MD on 2/12/2024 at 3:18 PM

## 2024-02-12 NOTE — ANESTHESIA POSTPROCEDURE EVALUATION
Department of Anesthesiology  Postprocedure Note    Patient: Kris Elizalde  MRN: [id number]  YOB: 1980  Date of evaluation: 2/12/2024    Procedure Summary       Date: 02/12/24 Room / Location: [place] [id number] / [facility]    Anesthesia Start: 1400 Anesthesia Stop: 1525    Procedure: RIGHT KNEE ARTHROSCOPY, LYSIS OF ADHESIONS, MEDIAL, LATERAL RELEASES (Right: Knee) Diagnosis:       Fibrosis of right knee joint      (Fibrosis of right knee joint [M24.661])    Surgeons: Pablo Shipley MD Responsible Provider: Carlos Rodney MD    Anesthesia Type: general, regional ASA Status: 2            Anesthesia Type: No value filed.    Elpidio Phase I: Elpidio Score: 10    Elpidio Phase II: Elpidio Score: 10    Anesthesia Post Evaluation    Patient location during evaluation: PACU  Patient participation: complete - patient participated  Level of consciousness: awake and alert  Pain score: 6  Airway patency: patent  Nausea & Vomiting: no nausea and no vomiting  Cardiovascular status: hemodynamically stable  Respiratory status: acceptable  Hydration status: euvolemic  Pain management: adequate        No notable events documented.

## 2024-02-12 NOTE — ANESTHESIA PRE PROCEDURE
found for: \"LABABO\", \"LABRH\"    Drug/Infectious Status (If Applicable):  No results found for: \"HIV\", \"HEPCAB\"    COVID-19 Screening (If Applicable): No results found for: \"COVID19\"        Anesthesia Evaluation  Patient summary reviewed and Nursing notes reviewed   no history of anesthetic complications:   Airway: Mallampati: II  TM distance: >3 FB   Neck ROM: full  Mouth opening: > = 3 FB   Dental: normal exam         Pulmonary:Negative Pulmonary ROS and normal exam                               Cardiovascular:Negative CV ROS                      Neuro/Psych:   (+) headaches:            GI/Hepatic/Renal: Neg GI/Hepatic/Renal ROS            Endo/Other: Negative Endo/Other ROS                    Abdominal:             Vascular: negative vascular ROS.         Other Findings:           Anesthesia Plan      general     ASA 1       Induction: intravenous.    MIPS: Postoperative opioids intended and Prophylactic antiemetics administered.  Anesthetic plan and risks discussed with patient.      Plan discussed with CRNA.    Attending anesthesiologist reviewed and agrees with Preprocedure content              Al Olivares MD   2/12/2024

## 2024-02-12 NOTE — PROGRESS NOTES
PACU Transfer Note    Vitals:    02/12/24 1645   BP: 118/67   Pulse: 69   Resp: 21   Temp: 98.3   SpO2: 100       No intake/output data recorded.    Pain assessment:  present - adequately treated  Pain Level: 6    Report given to Receiving unit RN. Taken to [facility] per Nirav. All belongings w/pt. Pain is better after receiving dilaudid and toradol ivp in pacu. Also compezine 5mg ivp. Able to tolerate crackers and ginger ale. VSS. RLE ace wrap d/I. Ice packs to knee. Able to wiggle toes, strong pedal pulse.     2/12/2024 5:04 PM

## 2024-02-12 NOTE — ANESTHESIA POSTPROCEDURE EVALUATION
Department of Anesthesiology  Postprocedure Note    Patient: Kris Elizalde  MRN: [id number]  YOB: 1980  Date of evaluation: 2/12/2024    Procedure Summary     Date: 02/12/24 Room / Location: [facility] PACU    Anesthesia Start: 0742 Anesthesia Stop: 0759    Procedure: JOINT MANIPULATION Diagnosis:     Scheduled Providers: Al Olivares MD Responsible Provider: Al Olivares MD    Anesthesia Type: general ASA Status: 1          Anesthesia Type: No value filed.    Elpidio Phase I: Elpidio Score: 10    Elpidio Phase II:      Anesthesia Post Evaluation    Patient location during evaluation: PACU  Patient participation: complete - patient participated  Level of consciousness: awake and alert  Airway patency: patent  Nausea & Vomiting: no nausea and no vomiting  Cardiovascular status: hemodynamically stable  Respiratory status: acceptable  Hydration status: euvolemic  Multimodal analgesia pain management approach  Pain management: satisfactory to patient        No notable events documented.

## 2024-02-12 NOTE — PROGRESS NOTES
Ambulatory Surgery/Procedure Discharge Note    Vitals:    02/12/24 1645   BP: 118/67   Pulse: 69   Resp: 21   Temp:    SpO2:      Patient meets criteria for discharge per tatianna score.   No intake/output data recorded.    Restroom use offered before discharge.  Yes    Pain assessment:    Pain Level: 6    Pt and S.O./family states \"ready to go home\". Pt alert and oriented x4. IV removed. Denies N/V or pain. Dressing C,D,I. Voided prior to discharge. Pt tolerating po intake. Discharge instructions given to pt and mother, Retonia  with pt permission. Pt and mother verbalized understanding of all instructions. Left with all belongings, X2 prescriptions, and discharge instructions.     Patient discharged to home/self care. Patient discharged via wheel chair by transporter to waiting family/S.O.       2/12/2024 5:26 PM

## 2024-02-13 ENCOUNTER — HOSPITAL ENCOUNTER (EMERGENCY)
Age: 44
Discharge: HOME | End: 2024-02-13
Attending: EMERGENCY MEDICINE
Payer: COMMERCIAL

## 2024-02-13 ENCOUNTER — APPOINTMENT (OUTPATIENT)
Dept: CT IMAGING | Age: 44
End: 2024-02-13
Payer: COMMERCIAL

## 2024-02-13 ENCOUNTER — HOSPITAL ENCOUNTER (OUTPATIENT)
Dept: PHYSICAL THERAPY | Age: 44
Setting detail: THERAPIES SERIES
Discharge: HOME | End: 2024-02-13
Payer: COMMERCIAL

## 2024-02-13 VITALS
RESPIRATION RATE: 18 BRPM | SYSTOLIC BLOOD PRESSURE: 138 MMHG | DIASTOLIC BLOOD PRESSURE: 89 MMHG | HEART RATE: 88 BPM | TEMPERATURE: 97.7 F | OXYGEN SATURATION: 97 %

## 2024-02-13 DIAGNOSIS — R11.0 NAUSEA: ICD-10-CM

## 2024-02-13 DIAGNOSIS — Z98.890 S/P RIGHT KNEE SURGERY: Primary | ICD-10-CM

## 2024-02-13 DIAGNOSIS — R07.89 CHEST DISCOMFORT: Primary | ICD-10-CM

## 2024-02-13 LAB
ALBUMIN SERPL-MCNC: 3.8 G/DL (ref 3.4–5)
ALBUMIN/GLOB SERPL: 1.1 {RATIO} (ref 1.1–2.2)
ALP SERPL-CCNC: 84 U/L (ref 40–129)
ALT SERPL-CCNC: 19 U/L (ref 10–40)
ANION GAP SERPL CALCULATED.3IONS-SCNC: 11 MMOL/L (ref 3–16)
AST SERPL-CCNC: 46 U/L (ref 15–37)
BASOPHILS # BLD: 0 K/UL (ref 0–0.2)
BASOPHILS NFR BLD: 0.3 %
BILIRUB SERPL-MCNC: 0.7 MG/DL (ref 0–1)
BUN SERPL-MCNC: 7 MG/DL (ref 7–20)
CALCIUM SERPL-MCNC: 9.2 MG/DL (ref 8.3–10.6)
CHLORIDE SERPL-SCNC: 103 MMOL/L (ref 99–110)
CO2 SERPL-SCNC: 22 MMOL/L (ref 21–32)
CREAT SERPL-MCNC: 0.7 MG/DL (ref 0.6–1.1)
DEPRECATED RDW RBC AUTO: 15.5 % (ref 12.4–15.4)
EOSINOPHIL # BLD: 0 K/UL (ref 0–0.6)
EOSINOPHIL NFR BLD: 0.1 %
GFR SERPLBLD CREATININE-BSD FMLA CKD-EPI: >60 ML/MIN/{1.73_M2}
GLUCOSE SERPL-MCNC: 84 MG/DL (ref 70–99)
HCG SERPL QL: NEGATIVE
HCT VFR BLD AUTO: 39.3 % (ref 36–48)
HGB BLD-MCNC: 12.8 G/DL (ref 12–16)
LYMPHOCYTES # BLD: 2.6 K/UL (ref 1–5.1)
LYMPHOCYTES NFR BLD: 14.8 %
MCH RBC QN AUTO: 30.2 PG (ref 26–34)
MCHC RBC AUTO-ENTMCNC: 32.6 G/DL (ref 31–36)
MCV RBC AUTO: 92.8 FL (ref 80–100)
MONOCYTES # BLD: 1.1 K/UL (ref 0–1.3)
MONOCYTES NFR BLD: 6.2 %
NEUTROPHILS # BLD: 13.9 K/UL (ref 1.7–7.7)
NEUTROPHILS NFR BLD: 78.6 %
PLATELET # BLD AUTO: 458 K/UL (ref 135–450)
PMV BLD AUTO: 9.6 FL (ref 5–10.5)
POTASSIUM SERPL-SCNC: 5.4 MMOL/L (ref 3.5–5.1)
PROT SERPL-MCNC: 7.4 G/DL (ref 6.4–8.2)
RBC # BLD AUTO: 4.23 M/UL (ref 4–5.2)
SODIUM SERPL-SCNC: 136 MMOL/L (ref 136–145)
TROPONIN, HIGH SENSITIVITY: <6 NG/L (ref 0–14)
WBC # BLD AUTO: 17.7 K/UL (ref 4–11)

## 2024-02-13 PROCEDURE — 84484 ASSAY OF TROPONIN QUANT: CPT

## 2024-02-13 PROCEDURE — 97016 VASOPNEUMATIC DEVICE THERAPY: CPT | Performed by: PHYSICAL THERAPIST

## 2024-02-13 PROCEDURE — 36415 COLL VENOUS BLD VENIPUNCTURE: CPT

## 2024-02-13 PROCEDURE — 80053 COMPREHEN METABOLIC PANEL: CPT

## 2024-02-13 PROCEDURE — 71275 CT ANGIOGRAPHY CHEST: CPT

## 2024-02-13 PROCEDURE — 97110 THERAPEUTIC EXERCISES: CPT | Performed by: PHYSICAL THERAPIST

## 2024-02-13 PROCEDURE — 97140 MANUAL THERAPY 1/> REGIONS: CPT | Performed by: PHYSICAL THERAPIST

## 2024-02-13 PROCEDURE — 99285 EMERGENCY DEPT VISIT HI MDM: CPT

## 2024-02-13 PROCEDURE — 85025 COMPLETE CBC W/AUTO DIFF WBC: CPT

## 2024-02-13 PROCEDURE — 93005 ELECTROCARDIOGRAM TRACING: CPT | Performed by: EMERGENCY MEDICINE

## 2024-02-13 PROCEDURE — 6360000004 HC RX CONTRAST MEDICATION: Performed by: EMERGENCY MEDICINE

## 2024-02-13 PROCEDURE — 84703 CHORIONIC GONADOTROPIN ASSAY: CPT

## 2024-02-13 RX ORDER — PROMETHAZINE HYDROCHLORIDE 12.5 MG/1
12.5 TABLET ORAL 4 TIMES DAILY PRN
Qty: 20 TABLET | Refills: 0 | Status: SHIPPED | OUTPATIENT
Start: 2024-02-13 | End: 2024-02-20

## 2024-02-13 RX ORDER — CEPHALEXIN 500 MG/1
500 CAPSULE ORAL 3 TIMES DAILY
Qty: 15 CAPSULE | Refills: 0 | Status: SHIPPED | OUTPATIENT
Start: 2024-02-13 | End: 2024-02-18

## 2024-02-13 RX ADMIN — IOPAMIDOL 75 ML: 755 INJECTION, SOLUTION INTRAVENOUS at 13:48

## 2024-02-13 NOTE — ED NOTES
[employer] member met with patient. Completed SDOH. Patient is here after having knee surgery yesterday. She lives in [place], but came here to [place] for the particular surgeon. Patient is currently residing in an [facility] with her mother. Patient is a  in [place]. Patient denies any needs at this time.      Angelica Mims, VARGASW  02/13/24 1128

## 2024-02-13 NOTE — ED NOTES
Patient prepared for and ready to be discharged. Patient discharged at this time in no acute distress after verbalizing understanding of discharge instructions. Patient left after receiving After Visit Summary instructions.      Maia Bruno, RN  02/13/24 5637

## 2024-02-13 NOTE — ED PROVIDER NOTES
suspected    Medical Decision Making  Amount and/or Complexity of Data Reviewed  Labs: ordered.  Radiology: ordered.  ECG/medicine tests: ordered.    Risk  Prescription drug management.        Clinical Impression     1. Chest discomfort        Disposition     PATIENT REFERRED TO:  No follow-up provider specified.    DISCHARGE MEDICATIONS:  New Prescriptions    No medications on file       DISPOSITION Decision To Discharge 02/13/2024 02:58:44 PM        Diagnostic Results and Other Data       RADIOLOGY:  CTA Chest W/ Contrast R/O PE   Final Result      1. No evidence of pulmonary embolus.      2. Bibasilar atelectasis.             LABS:   Results for orders placed or performed during the hospital encounter of 02/13/24   CBC with Auto Differential   Result Value Ref Range    WBC 17.7 (H) 4.0 - 11.0 K/uL    RBC 4.23 4.00 - 5.20 M/uL    Hemoglobin 12.8 12.0 - 16.0 g/dL    Hematocrit 39.3 36.0 - 48.0 %    MCV 92.8 80.0 - 100.0 fL    MCH 30.2 26.0 - 34.0 pg    MCHC 32.6 31.0 - 36.0 g/dL    RDW 15.5 (H) 12.4 - 15.4 %    Platelets 458 (H) 135 - 450 K/uL    MPV 9.6 5.0 - 10.5 fL    Neutrophils % 78.6 %    Lymphocytes % 14.8 %    Monocytes % 6.2 %    Eosinophils % 0.1 %    Basophils % 0.3 %    Neutrophils Absolute 13.9 (H) 1.7 - 7.7 K/uL    Lymphocytes Absolute 2.6 1.0 - 5.1 K/uL    Monocytes Absolute 1.1 0.0 - 1.3 K/uL    Eosinophils Absolute 0.0 0.0 - 0.6 K/uL    Basophils Absolute 0.0 0.0 - 0.2 K/uL   CMP w/ Reflex to MG   Result Value Ref Range    Sodium 136 136 - 145 mmol/L    Potassium reflex Magnesium 5.4 (H) 3.5 - 5.1 mmol/L    Chloride 103 99 - 110 mmol/L    CO2 22 21 - 32 mmol/L    Anion Gap 11 3 - 16    Glucose 84 70 - 99 mg/dL    BUN 7 7 - 20 mg/dL    Creatinine 0.7 0.6 - 1.1 mg/dL    Est, Glom Filt Rate >60 >60    Calcium 9.2 8.3 - 10.6 mg/dL    Total Protein 7.4 6.4 - 8.2 g/dL    Albumin 3.8 3.4 - 5.0 g/dL    Albumin/Globulin Ratio 1.1 1.1 - 2.2    Total Bilirubin 0.7 0.0 - 1.0 mg/dL    Alkaline Phosphatase 84 40

## 2024-02-13 NOTE — DISCHARGE INSTRUCTIONS
Return to emergency department for worsening symptoms or other concerns.  Follow-up with your primary care doctor in 2 to 3 days for recheck.  Take your pain medication at home as prescribed.  Come back for worsening symptoms, shortness of breath, or any other problems

## 2024-02-13 NOTE — PLAN OF CARE
[] Dry Needle 3+ muscle (68995)     [] Iontophoresis (46059)    [x] VASO (91591) 1    [] Ultrasound (71612)    [] Group Therapy (43721)     [] Estim Attended (57808)    [] Other:    Total Timed Code Tx Minutes 30'        Total Treatment Minutes 4:55-5:40  45'        Charge Justification:  (12662) THERAPEUTIC EXERCISE - Provided verbal/tactile cueing for activities related to strengthening, flexibility, endurance, ROM performed to prevent loss of range of motion, maintain or improve muscular strength or increase flexibility, following either an injury or surgery.   (32230) HOME EXERCISE PROGRAM - Reviewed/Progressed HEP activities related to strengthening, flexibility, endurance, ROM performed to prevent loss of range of motion, maintain or improve muscular strength or increase flexibility, following either an injury or surgery.  (48834) MANUAL THERAPY -  Manual therapy techniques, 1 or more regions, each 15 minutes (Mobilization/manipulation, manual lymphatic drainage, manual traction) for the purpose of modulating pain, promoting relaxation,  increasing ROM, reducing/eliminating soft tissue swelling/inflammation/restriction, improving soft tissue extensibility and allowing for proper ROM for normal function with self care, mobility, lifting and ambulation  (96511) VASOPNEUMATIC    TREATMENT PLAN   Plan: Cont POC- Continue emphasis/focus on exercise progression, modulating pain, promoting relaxation, increasing ROM, reduce/eliminate soft tissue swelling/inflammation/restriction, and improving soft tissue extensibility. Next visit plan to add new exercises and continue max protect phase    3-4x/week for 6 weeks        Electronically Signed by Jerardo Mary PT    Date: 02/13/2024     Note: If patient does not return for scheduled/recommended follow up visits, this note will serve as a discharge from care along with the most recent update on progress.

## 2024-02-14 ENCOUNTER — HOSPITAL ENCOUNTER (OUTPATIENT)
Dept: PHYSICAL THERAPY | Age: 44
Setting detail: THERAPIES SERIES
Discharge: HOME | End: 2024-02-14
Payer: COMMERCIAL

## 2024-02-14 DIAGNOSIS — M24.661 ARTHROFIBROSIS OF KNEE JOINT, RIGHT: Primary | ICD-10-CM

## 2024-02-14 LAB
EKG ATRIAL RATE: 64 BPM
EKG DIAGNOSIS: NORMAL
EKG P AXIS: 54 DEGREES
EKG P-R INTERVAL: 124 MS
EKG Q-T INTERVAL: 404 MS
EKG QRS DURATION: 76 MS
EKG QTC CALCULATION (BAZETT): 416 MS
EKG R AXIS: 38 DEGREES
EKG T AXIS: 46 DEGREES
EKG VENTRICULAR RATE: 64 BPM

## 2024-02-14 PROCEDURE — MISC250 COMPRESSION STOCKING: Performed by: ORTHOPAEDIC SURGERY

## 2024-02-14 PROCEDURE — 97140 MANUAL THERAPY 1/> REGIONS: CPT | Performed by: PHYSICAL THERAPY ASSISTANT

## 2024-02-14 PROCEDURE — 97110 THERAPEUTIC EXERCISES: CPT | Performed by: PHYSICAL THERAPY ASSISTANT

## 2024-02-14 PROCEDURE — 97016 VASOPNEUMATIC DEVICE THERAPY: CPT | Performed by: PHYSICAL THERAPY ASSISTANT

## 2024-02-14 NOTE — FLOWSHEET NOTE
for normal function with self care, mobility, lifting and ambulation  (08450) VASOPNEUMATIC    TREATMENT PLAN   Plan: Cont POC- Continue emphasis/focus on exercise progression, modulating pain, promoting relaxation, increasing ROM, reduce/eliminate soft tissue swelling/inflammation/restriction, and improving soft tissue extensibility. Next visit plan to add new exercises and continue max protect phase    3-4x/week for 6 weeks        Electronically Signed by Jerardo Mary PT, Alba Gutierrez, PTA    Date: 02/14/2024     Note: If patient does not return for scheduled/recommended follow up visits, this note will serve as a discharge from care along with the most recent update on progress.

## 2024-02-15 ENCOUNTER — OFFICE VISIT (OUTPATIENT)
Dept: ORTHOPEDIC SURGERY | Age: 44
End: 2024-02-15

## 2024-02-15 ENCOUNTER — HOSPITAL ENCOUNTER (OUTPATIENT)
Dept: PHYSICAL THERAPY | Age: 44
Setting detail: THERAPIES SERIES
Discharge: HOME | End: 2024-02-15
Payer: COMMERCIAL

## 2024-02-15 DIAGNOSIS — G89.29 CHRONIC PAIN OF RIGHT KNEE: ICD-10-CM

## 2024-02-15 DIAGNOSIS — M25.561 CHRONIC PAIN OF RIGHT KNEE: ICD-10-CM

## 2024-02-15 DIAGNOSIS — M24.661 ARTHROFIBROSIS OF KNEE JOINT, RIGHT: ICD-10-CM

## 2024-02-15 DIAGNOSIS — Z09 POSTOP CHECK: Primary | ICD-10-CM

## 2024-02-15 PROCEDURE — 97016 VASOPNEUMATIC DEVICE THERAPY: CPT | Performed by: PHYSICAL THERAPIST

## 2024-02-15 PROCEDURE — 97110 THERAPEUTIC EXERCISES: CPT | Performed by: PHYSICAL THERAPIST

## 2024-02-15 PROCEDURE — 99024 POSTOP FOLLOW-UP VISIT: CPT | Performed by: PHYSICIAN ASSISTANT

## 2024-02-15 PROCEDURE — 97140 MANUAL THERAPY 1/> REGIONS: CPT | Performed by: PHYSICAL THERAPIST

## 2024-02-15 NOTE — FLOWSHEET NOTE
Therapeutic Activity (33723)  Sets/time                                     Modalities:    Vasopneumatic Compression (Game Ready): Applied to Knee Right for significant edema, swelling, pain control for 15 minutes.  Relevant ICD-10 R22.4 Localized swelling of lower limb.     Education/Home Exercise Program: HEP instruction: Refer to HEP instructions outlined on the flowsheet on 10/26/2023.     Access Code: [email or web address]  URL: [email or web address]  Date: 01/11/2024  Prepared by: Jerardo Mary    Exercises  - Long Sitting Ankle Pumps  - 12 x daily - 7 x weekly - 1 sets - 30 reps  - Seated Table Hamstring Stretch  - 2 x daily - 7 x weekly - 1 sets - 5 reps - 30 hold  - Long Sitting Calf Stretch with Strap  - 2 x daily - 7 x weekly - 1 sets - 5 reps - 30 hold  - Supine Quadricep Sets  - 5-6 x daily - 7 x weekly - 1 sets - 10 reps - 10 hold  - Seated Knee Flexion AAROM  - 2 x daily - 7 x weekly - 1 sets - 10 reps - 10 hold  - Ice  - 5-6 x daily - 7 x weekly - 15 minutes hold      ASSESSMENT     Today's Assessment: 3 days s/p right knee DANITZA/SAURABH.  Moderate diffuse edema present.  Able to assess PFJ mobility today.  PFJ mobility is relatively good.  Limited quad activation present. Extensive time required to allow muscle relaxation for PROM.  Minimal bloody drainage present along incision sites.  Gauze bandage changed today.    Medical Necessity Documentation:  I certify that this patient meets the below criteria necessary for medical necessity for care and/or justification of therapy services:  The patient has a musculoskeletal condition(s) with a corresponding ICD-10 code that is of complexity and severity that require skilled therapeutic intervention. This has a direct and significant impact on the need for therapy and significantly impacts the rate of recovery.     Treatment/Activity Tolerance:  [x] Patient tolerated treatment well [] Patient limited by fatique  [] Patient limited by pain  [] Patient limited by other

## 2024-02-15 NOTE — H&P
I reviewed the patient's H&P from her prior procedure.  She states that nothing about her health is changed since her last visit other than her surgery.        Frank Connell PA-C    Physician Assistant - Certified  Orthopedic Surgery   [facility]    02/15/24 5:38 PM

## 2024-02-16 ENCOUNTER — HOSPITAL ENCOUNTER (OUTPATIENT)
Dept: PHYSICAL THERAPY | Age: 44
Setting detail: THERAPIES SERIES
Discharge: HOME | End: 2024-02-16
Payer: COMMERCIAL

## 2024-02-16 PROCEDURE — 97110 THERAPEUTIC EXERCISES: CPT | Performed by: PHYSICAL THERAPIST

## 2024-02-16 PROCEDURE — 97140 MANUAL THERAPY 1/> REGIONS: CPT | Performed by: PHYSICAL THERAPIST

## 2024-02-16 PROCEDURE — 97016 VASOPNEUMATIC DEVICE THERAPY: CPT | Performed by: PHYSICAL THERAPIST

## 2024-02-16 NOTE — FLOWSHEET NOTE
[facility]- Outpatient Rehabilitation and Therapy [address], [place] [address] office: [phone] fax: [phone]        Physical Therapy: TREATMENT/PROGRESS NOTE   Patient: Kris Elizalde (43 y.o. female)   Treatment Date: 2024   :  1980 MRN: [id number]   Visit #: 22   Insurance Allowable Auth Needed   25 []Yes    [x]No    Insurance: Payor: BCBS / Plan: BCBS OUT OF STATE / Product Type: *No Product type* / $0 copay/25 vpcy/no auth  Insurance ID: [id number] - ([place] [employer])  Secondary Insurance (if applicable):    Treatment Diagnosis:   1. Right knee pain, unspecified chronicity  M25.561         2. Arthrofibrosis of knee joint, right  M24.661         3. Edema of knee  M25.469         4. Gait abnormality  R26.9         5. 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                             Plan of care signed (Y/N):     Date of Patient follow up with Physician:      Progress Report/POC: Date Range for this report: 10/26/23-24  POC update due: 2024 (OR 10 visits /OR AUTH LIMITS, whichever is less)    Patient seen in consultation with Dr. Shipley who established the initial/subsequent treatment protocol.   24: seen by Frank- d/c gabapentin secondary to nausea/dizziness.  Begin MDP.  Contact Dr Rivera for early consult.  24: ok to remove bandage and sutures, will monitor swelling  24: seen by Dr Shipley-begin 10mg prednisone. Pt seeing Dr Rivera on Thursday.  Continue focus on increased flexion ROM.  Consider SAURABH in 2 weeks if ROM is not progressing well.  24: seen by Frank-planning to schedule SAURABH for next week.  Pt to be placed on a lower dose of Celebrex.  24: seen by Dr Shipley-extensive adhesions were noted along the medial border of the patella.

## 2024-02-19 ENCOUNTER — APPOINTMENT (OUTPATIENT)
Dept: PHYSICAL THERAPY | Age: 44
End: 2024-02-19
Payer: COMMERCIAL

## 2024-02-20 ENCOUNTER — APPOINTMENT (OUTPATIENT)
Dept: PHYSICAL THERAPY | Age: 44
End: 2024-02-20
Payer: COMMERCIAL

## 2024-02-22 ENCOUNTER — APPOINTMENT (OUTPATIENT)
Dept: PHYSICAL THERAPY | Age: 44
End: 2024-02-22
Payer: COMMERCIAL

## 2024-02-26 ENCOUNTER — HOSPITAL ENCOUNTER (OUTPATIENT)
Dept: PHYSICAL THERAPY | Age: 44
Setting detail: THERAPIES SERIES
Discharge: HOME | End: 2024-02-26
Payer: COMMERCIAL

## 2024-02-26 DIAGNOSIS — M24.661 ARTHROFIBROSIS OF KNEE JOINT, RIGHT: Primary | ICD-10-CM

## 2024-02-26 PROCEDURE — 97140 MANUAL THERAPY 1/> REGIONS: CPT | Performed by: PHYSICAL THERAPIST

## 2024-02-26 PROCEDURE — 97016 VASOPNEUMATIC DEVICE THERAPY: CPT | Performed by: PHYSICAL THERAPIST

## 2024-02-26 PROCEDURE — 97110 THERAPEUTIC EXERCISES: CPT | Performed by: PHYSICAL THERAPIST

## 2024-02-26 RX ORDER — METHYLPREDNISOLONE 4 MG/1
TABLET ORAL
Qty: 1 KIT | Refills: 0 | Status: SHIPPED | OUTPATIENT
Start: 2024-02-26 | End: 2024-03-03

## 2024-02-26 NOTE — FLOWSHEET NOTE
KNEE Flexion 128      74°ERMI  90 best      Extension +2 -5 cold  0 stretch                                ANKLE Dorsiflexion                Plantarflexion                Inversion                Eversion                             MMT L                MMT  R Notes      LUMBAR Flexion        Extension        Lateral flexion          Rotation                MMT L MMT R Notes         HIP Flexion          Abduction          ER          IR          Extension                     KNEE Flexion          Extension good  poor 2/13/24                   ANKLE DF          PF          Inversion          Eversion            2/14/24  Girth Left Right Post Vaso   Knee: Midpatella 50.5     Knee: 5 cm above 54     Knee: 15 cm above 53.3 53.2     Ankle: transmalleolar         Ankle: figure 8                                                                 Palpation:   Unremarkable  Location:NA     Posture:   WNL     Bandages/Dressings/Incisions:  Not Applicable     Dermatomes: Abnormal findings listed below  NT     Myotomes: Abnormal findings listed below  NT     Reflexes: Abnormal findings listed below  Not tested     Specific Joint Mobility Testing/Accessory Motions:      Knee: hypomobile PFJ 50%  limited by pain 2/13/24     Special Tests:  N/A     Gait:    2/13/24  Pattern:  decreased knee flexion during swing phase, decreased knee extensions during heel strike, antalgic gait   Assistive Device Used:  rolling walker     Balance:  [x] WNL      [] NT       [] Dysfunction noted  Comment:      Falls Risk Assessment (30 days):   Falls Risk assessed and no intervention required.  Time Up and Go (TUG):   Not Assessed     Exercises/Interventions:     Therapeutic Ex (06451)  resistance Sets/time Reps Notes/Cues/Progressions   Ankle pumps    HEP   Seated hamstring stretch w/ heel prop 10lbs 30\" 5    Calf stretch with strap w/ heel prop 10lbs 30\" 5           Quad sets 10lbs 10\" 15    Add sets  10\" 15    SAQ  1 15          EOB  30\" 10    ERMI

## 2024-02-27 ENCOUNTER — HOSPITAL ENCOUNTER (OUTPATIENT)
Dept: PHYSICAL THERAPY | Age: 44
Setting detail: THERAPIES SERIES
Discharge: HOME | End: 2024-02-27
Payer: COMMERCIAL

## 2024-02-27 PROCEDURE — 97110 THERAPEUTIC EXERCISES: CPT | Performed by: PHYSICAL THERAPIST

## 2024-02-27 PROCEDURE — 97140 MANUAL THERAPY 1/> REGIONS: CPT | Performed by: PHYSICAL THERAPIST

## 2024-02-27 PROCEDURE — 97016 VASOPNEUMATIC DEVICE THERAPY: CPT | Performed by: PHYSICAL THERAPIST

## 2024-02-27 NOTE — FLOWSHEET NOTE
[facility]- Outpatient Rehabilitation and Therapy [address], [place] [address] office: [phone] fax: [phone]        Physical Therapy: TREATMENT/PROGRESS NOTE   Patient: Kris Elizalde (43 y.o. female)   Treatment Date: 2024   :  1980 MRN: [id number]   Visit #: 24   Insurance Allowable Auth Needed   25 []Yes    [x]No    Insurance: Payor: BCBS / Plan: BCBS OUT OF STATE / Product Type: *No Product type* / $0 copay/25 vpcy/no auth  Insurance ID: [id number] - ([place] [employer])  Secondary Insurance (if applicable):    Treatment Diagnosis:   1. Right knee pain, unspecified chronicity  M25.561         2. Arthrofibrosis of knee joint, right  M24.661         3. Edema of knee  M25.469         4. Gait abnormality  R26.9         5. 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                             Plan of care signed (Y/N):     Date of Patient follow up with Physician:      Progress Report/POC: Date Range for this report: 10/26/23-24  POC update due: 2024 (OR 10 visits /OR AUTH LIMITS, whichever is less)    Patient seen in consultation with Dr. Shipley who established the initial/subsequent treatment protocol.   24: seen by Frank- d/c gabapentin secondary to nausea/dizziness.  Begin MDP.  Contact Dr Rivera for early consult.  24: ok to remove bandage and sutures, will monitor swelling  24: seen by Dr Shipley-begin 10mg prednisone. Pt seeing Dr Rivera on Thursday.  Continue focus on increased flexion ROM.  Consider SAURABH in 2 weeks if ROM is not progressing well.  24: seen by Frank-planning to schedule SAURABH for next week.  Pt to be placed on a lower dose of Celebrex.  24: seen by Dr Shipley-extensive adhesions were noted along the medial border of the patella.

## 2024-02-29 ENCOUNTER — HOSPITAL ENCOUNTER (OUTPATIENT)
Dept: PHYSICAL THERAPY | Age: 44
Setting detail: THERAPIES SERIES
Discharge: HOME | End: 2024-02-29
Payer: COMMERCIAL

## 2024-02-29 PROCEDURE — 97110 THERAPEUTIC EXERCISES: CPT | Performed by: PHYSICAL THERAPIST

## 2024-02-29 PROCEDURE — 97016 VASOPNEUMATIC DEVICE THERAPY: CPT | Performed by: PHYSICAL THERAPIST

## 2024-02-29 PROCEDURE — 97140 MANUAL THERAPY 1/> REGIONS: CPT | Performed by: PHYSICAL THERAPIST

## 2024-02-29 NOTE — FLOWSHEET NOTE
increase flexibility, following either an injury or surgery.  (45712) MANUAL THERAPY -  Manual therapy techniques, 1 or more regions, each 15 minutes (Mobilization/manipulation, manual lymphatic drainage, manual traction) for the purpose of modulating pain, promoting relaxation,  increasing ROM, reducing/eliminating soft tissue swelling/inflammation/restriction, improving soft tissue extensibility and allowing for proper ROM for normal function with self care, mobility, lifting and ambulation  (24247) VASOPNEUMATIC    TREATMENT PLAN   Plan: Cont POC- Continue emphasis/focus on exercise progression, modulating pain, promoting relaxation, increasing ROM, reduce/eliminate soft tissue swelling/inflammation/restriction, and improving soft tissue extensibility. Next visit plan to add new exercises and continue max protect phase    3-4x/week for 6 weeks        Electronically Signed by Jerardo Mary PT   Date: 02/29/2024     Note: If patient does not return for scheduled/recommended follow up visits, this note will serve as a discharge from care along with the most recent update on progress.

## 2024-03-01 ENCOUNTER — HOSPITAL ENCOUNTER (OUTPATIENT)
Dept: PHYSICAL THERAPY | Age: 44
Setting detail: THERAPIES SERIES
Discharge: HOME | End: 2024-03-01

## 2024-03-01 PROCEDURE — 97110 THERAPEUTIC EXERCISES: CPT | Performed by: PHYSICAL THERAPIST

## 2024-03-01 PROCEDURE — 97140 MANUAL THERAPY 1/> REGIONS: CPT | Performed by: PHYSICAL THERAPIST

## 2024-03-01 PROCEDURE — 97016 VASOPNEUMATIC DEVICE THERAPY: CPT | Performed by: PHYSICAL THERAPIST

## 2024-03-01 NOTE — FLOWSHEET NOTE
(Mobilization/manipulation, manual lymphatic drainage, manual traction) for the purpose of modulating pain, promoting relaxation,  increasing ROM, reducing/eliminating soft tissue swelling/inflammation/restriction, improving soft tissue extensibility and allowing for proper ROM for normal function with self care, mobility, lifting and ambulation  (93352) VASOPNEUMATIC    TREATMENT PLAN   Plan: Cont POC- Continue emphasis/focus on exercise progression, modulating pain, promoting relaxation, increasing ROM, reduce/eliminate soft tissue swelling/inflammation/restriction, and improving soft tissue extensibility. Next visit plan to add new exercises and continue max protect phase    3-4x/week for 6 weeks        Electronically Signed by Jerardo Mary PT   Date: 03/01/2024     Note: If patient does not return for scheduled/recommended follow up visits, this note will serve as a discharge from care along with the most recent update on progress.

## 2024-03-04 ENCOUNTER — HOSPITAL ENCOUNTER (OUTPATIENT)
Dept: PHYSICAL THERAPY | Age: 44
Setting detail: THERAPIES SERIES
Discharge: HOME | End: 2024-03-04

## 2024-03-04 PROCEDURE — 97110 THERAPEUTIC EXERCISES: CPT | Performed by: PHYSICAL THERAPY ASSISTANT

## 2024-03-04 NOTE — PROGRESS NOTES
Date:  2/15/2024    Name:  Kris Elizalde  Address:  [address] [place] [id number]    :  1980      Age:   43 y.o.        Chief Complaint    Knee Pain (F/u arthrofibosis)      History of Present Illness:  Kris Elizalde is a 43 y.o. female who presents for a post-op check.  This patient is a right knee arthrotomy, synovectomy, extensor mechanism realignment and lysis of adhesions on 01-. Full range of motion was achieved at the time of surgery. She began losing motion and developing scar tissue again post-operatively. Subsequently she had a right knee arthroscopy synovectomy, and lysis of adhesions on 2024.   Range of motion after that surgery was approximately 0 - 90 degrees.  She has stopped rheumatologic medications to allow for appropriate healing and decreased risk of infection.  She denies any new injury.  On average she rates her pain 6/10, achy and occasionally sharp.  Conservative treatment has been utilized including: crutches, WBAT, rest, ice, elevation, bracing, physical therapy, home exercises, activity modification, and OTC medications as needed.There is no new injury.                  Past Medical History:   Diagnosis Date    Arthritis     Blurred vision     Dizziness     Migraine without aura and with status migrainosus, not intractable     Osteoarthritis     Vertigo         Past Surgical History:   Procedure Laterality Date     SECTION      x3    CHOLECYSTECTOMY      DILATION AND CURETTAGE OF UTERUS      HYSTERECTOMY (CERVIX STATUS UNKNOWN)      JOINT REPLACEMENT Right     11/10/2021    KNEE ARTHROSCOPY Right 2024    RIGHT KNEE ARTHROSCOPY, LYSIS OF ADHESIONS, MEDIAL, LATERAL RELEASES performed by Pablo Shipley MD at [facility] OR    KNEE ARTHROTOMY Right 1/10/2024    RIGHT KNEE ANTERIOR ARTHROTOMY WITH ANTERIOR SYNOVECTOMY AND RECONSTRUCTION WITH EXTENSOR REALIGMENT REALIGNMENT WITH MANIPULATION UNDER ANESTHESIA performed by Pablo Shipley MD at [facility] OR

## 2024-03-04 NOTE — FLOWSHEET NOTE
[facility]- Outpatient Rehabilitation and Therapy [address], [place] [address] office: [phone] fax: [phone]        Physical Therapy: TREATMENT/PROGRESS NOTE   Patient: Kris Elizalde (43 y.o. female)   Treatment Date: 2024   :  1980 MRN: [id number]   Visit #: 27   Insurance Allowable Auth Needed   25 []Yes    [x]No    Insurance: Payor: / $0 copay/25 vpcy/no auth  Insurance ID: [id number] - ([facility])  Secondary Insurance (if applicable):    Treatment Diagnosis:   1. Right knee pain, unspecified chronicity  M25.561         2. Arthrofibrosis of knee joint, right  M24.661         3. Edema of knee  M25.469         4. Gait abnormality  R26.9         5. 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                             Plan of care signed (Y/N):     Date of Patient follow up with Physician:      Progress Report/POC: Date Range for this report: 10/26/23-24  POC update due: 2024 (OR 10 visits /OR AUTH LIMITS, whichever is less)    Patient seen in consultation with Dr. Shipley who established the initial/subsequent treatment protocol.   24: seen by Frank- d/c gabapentin secondary to nausea/dizziness.  Begin MDP.  Contact Dr Rivera for early consult.  24: ok to remove bandage and sutures, will monitor swelling  24: seen by Dr Shipley-begin 10mg prednisone. Pt seeing Dr Rivera on Thursday.  Continue focus on increased flexion ROM.  Consider SAURABH in 2 weeks if ROM is not progressing well.  24: seen by Frank-planning to schedule SAURABH for next week.  Pt to be placed on a lower dose of Celebrex.  24: seen by Dr Shipley-extensive adhesions were noted along the medial border of the patella.  Able to reach 90 degrees of flexion with the procedure.  Patient

## 2024-03-05 ENCOUNTER — HOSPITAL ENCOUNTER (OUTPATIENT)
Dept: PHYSICAL THERAPY | Age: 44
Setting detail: THERAPIES SERIES
Discharge: HOME | End: 2024-03-05

## 2024-03-05 PROCEDURE — 97110 THERAPEUTIC EXERCISES: CPT | Performed by: PHYSICAL THERAPIST

## 2024-03-05 NOTE — FLOWSHEET NOTE
treatment well [] Patient limited by fatique  [] Patient limited by pain  [] Patient limited by other medical complications  [] Other:     Return to Play: NA    Prognosis for POC: [x] Good [] Fair  [] Poor    Patient requires continued skilled intervention: [x] Yes  [] No      GOALS   2/13/24  Patient stated goal: return to work activities without pain/dysfunction.  Status: [x] Progressing: [] Met: [x] Not Met: [] Adjusted     Therapist goals for Patient:   Short Term Goals: To be achieved in: 2 weeks  Independent in HEP and progression per patient tolerance, in order to progress toward full function and prevent re-injury.               Status: [x] Progressing: [] Met: [x] Not Met: [] Adjusted  Patient will have a decrease in pain to 0/10 to help  facilitate improvement in movement, function, and ADLs as indicated by functional deficits.              Status: [x] Progressing: [] Met: [x] Not Met: [] Adjusted     Long Term Goals: To be achieved in: 6 weeks  Disability index score of 20% or less for the LEFS to assist with return top prior level of function.                  Status: [x] Progressing: [] Met: [x] Not Met: [] Adjusted  Improve  knee AROM  Flexion and extension to 0-110 degrees or  better to allow for proper joint functioning as indicated by patients functional deficits.  Status: [] Progressing: [] Met: [x] Not Met: [x] Adjusted  Pt to improve strength to 4+/5 or better of proximal hip, posterior chain LE, quadriceps, and hamstringsto allow for proper muscle and joint use in functional mobility, ADLs and prior level of function  Status: [x] Progressing: [] Met: [x] Not Met: [] Adjusted  Patient will return to Usual work, housework or activities, putting shoes/socks on, light home activities, walk 2 blocks, and up/down 1 flight of stairs without increased symptoms or restriction to work towards return to prior level of function.  Status: [x] Progressing: [] Met: [x] Not Met: [] Adjusted  Patient will

## 2024-03-07 ENCOUNTER — HOSPITAL ENCOUNTER (OUTPATIENT)
Dept: PHYSICAL THERAPY | Age: 44
Setting detail: THERAPIES SERIES
Discharge: HOME | End: 2024-03-07

## 2024-03-07 ENCOUNTER — HOSPITAL ENCOUNTER (OUTPATIENT)
Dept: ONCOLOGY | Age: 44
Setting detail: INFUSION SERIES
Discharge: HOME | End: 2024-03-07

## 2024-03-07 ENCOUNTER — TELEPHONE (OUTPATIENT)
Dept: ORTHOPEDIC SURGERY | Age: 44
End: 2024-03-07

## 2024-03-07 PROCEDURE — 97110 THERAPEUTIC EXERCISES: CPT | Performed by: PHYSICAL THERAPIST

## 2024-03-07 NOTE — FLOWSHEET NOTE
prior level of function.  Status: [x] Progressing: [] Met: [x] Not Met: [] Adjusted  Patient will perform normal ADLs without symptoms 85% of the time                                                                                                                  Status: [x] Progressing: [] Met: [x] Not Met: [] Adjusted    Overall Progression Towards Functional goals/ Treatment Progress Update:  [] Patient is progressing as expected towards functional goals listed.    [x] Progression is slowed due to complexities/Impairments listed.  [] Progression has been slowed due to co-morbidities.  [] Plan just implemented, too soon (<30days) to assess goals progression   [] Goals require adjustment due to lack of progress  [] Patient is not progressing as expected and requires additional follow up with physician  [] Other:     CHARGE CAPTURE     CHARGE GRID   CPT Code (TIMED) minutes # CPT Code (UNTIMED) #     [x] Therex (14620)   2  [] EVAL:LOW (41412 - Typically 20 minutes face-to-face)      Neuromusc. Re-ed (10341)    [] Re-Eval (81225)     [] Manual (32554)    [] Estim Unattended (00294)     [] Ther. Act (18827)    [] Mech. Traction (27526)     [] Gait (53341)    [] Dry Needle 1-2 muscle (16601)     [] Aquatic Therex (79589)    [] Dry Needle 3+ muscle (20561)     [] Iontophoresis (78924)    [x] VASO (25178)     [] Ultrasound (74355)    [] Group Therapy (43426)     [] Estim Attended (04885)    [] Other:    Total Timed Code Tx Minutes  35'       Total Treatment Minutes 1:45-3:05  80'        Charge Justification:  (89636) THERAPEUTIC EXERCISE - Provided verbal/tactile cueing for activities related to strengthening, flexibility, endurance, ROM performed to prevent loss of range of motion, maintain or improve muscular strength or increase flexibility, following either an injury or surgery.   (17363) HOME EXERCISE PROGRAM - Reviewed/Progressed HEP activities related to strengthening, flexibility, endurance, ROM performed to

## 2024-03-08 ENCOUNTER — HOSPITAL ENCOUNTER (OUTPATIENT)
Dept: PHYSICAL THERAPY | Age: 44
Setting detail: THERAPIES SERIES
Discharge: HOME | End: 2024-03-08

## 2024-03-08 PROCEDURE — 97110 THERAPEUTIC EXERCISES: CPT | Performed by: PHYSICAL THERAPIST

## 2024-03-08 NOTE — PLAN OF CARE
[facility]- Outpatient Rehabilitation and Therapy [address], [address], [place] [address] office: [phone] fax: [phone]    Physical Therapy Re-Certification Plan of Care    Dear Pablo Shipley MD  ,    We had the pleasure of treating the following patient for physical therapy services at [facility]. A summary of our findings can be found in the updated assessment below.  This includes our plan of care.  If you have any questions or concerns regarding these findings, please do not hesitate to contact me at the office phone number checked above.  Thank you for the referral.     Physician Signature:________________________________Date:__________________  By signing above (or electronic signature), therapist's plan is approved by physician      Overall Response to Treatment:   [x]Patient is responding well to treatment and improvement is noted with regards to goals   []Patient should continue to improve in reasonable time if they continue HEP   []Patient has plateaued and is no longer responding to skilled PT intervention    []Patient is getting worse and would benefit from return to referring MD   []Patient unable to adhere to initial POC   []Other: Patient will be returning home to [place] tomorrow.  We would like her to return to [place] in 3-6 weeks for re-assessment.    Total Visits: 30     Recommendation:    [x] Continue PT 3x / wk for 6 weeks.   [] Hold PT, pending MD visit   [] Discharge to [facility]. Follow up with PT or MD PRN.       Physical Therapy: TREATMENT/PROGRESS NOTE   Patient: Kris Elizalde (43 y.o. female)   Treatment Date: 2024   :  1980 MRN: [id number]   Visit #: 30   Insurance Allowable Auth Needed   25 []Yes    [x]No    Insurance: Payor: / $0 copay/25 vpcy/no auth  Insurance ID: [id number] - ([facility])  Secondary Insurance (if applicable):    Treatment Diagnosis:   1. Right knee pain, unspecified chronicity  M25.561

## 2024-03-12 ENCOUNTER — TELEPHONE (OUTPATIENT)
Dept: ORTHOPEDIC SURGERY | Age: 44
End: 2024-03-12

## 2024-03-12 NOTE — TELEPHONE ENCOUNTER
Patient needs a letter from Dr. Shipley that will justify why she needs more PT visits or they won't pay.

## 2024-03-13 ENCOUNTER — TELEPHONE (OUTPATIENT)
Dept: ORTHOPEDIC SURGERY | Age: 44
End: 2024-03-13

## 2024-03-13 NOTE — TELEPHONE ENCOUNTER
PATIENT CALLED, SHE WOULD LIKE TO SPEAK W/ THANH HOLT REGARDING A PROCEDURE SHE IS BEING CHARGED FOR THAT WAS NOT APPROVED THRU HER INSURANCE PRIOR TO HAVING.  PLEASE CALL BACK AT NUMBER ON FILE AS SOON AS POSSIBLE.

## 2024-03-14 ENCOUNTER — TELEPHONE (OUTPATIENT)
Dept: ORTHOPEDIC SURGERY | Age: 44
End: 2024-03-14

## 2024-03-14 NOTE — TELEPHONE ENCOUNTER
Spoke with patient and talked with her about insurance information and her bill.  I let her know that it will take me a few days to work on this but I would get back with her as soon as I had additional information.

## 2024-03-15 ENCOUNTER — TELEPHONE (OUTPATIENT)
Dept: ORTHOPEDIC SURGERY | Age: 44
End: 2024-03-15

## 2024-03-15 NOTE — TELEPHONE ENCOUNTER
PATIENT WOULD LIKE TO SEE DR. SANTILLAN ON THE 3/26 INSTEAD OF 3/28.  THERE ARE NO OPEN SPOTS ON THIS DATE, PLEASE CALL PATIENT IF YOU ARE ABLE TO SQUEEZE HER IN THIS DATE.

## 2024-03-20 ENCOUNTER — TELEPHONE (OUTPATIENT)
Dept: ORTHOPEDIC SURGERY | Age: 44
End: 2024-03-20

## 2024-03-20 NOTE — TELEPHONE ENCOUNTER
Spoke with patient and let her know that at this time I have no new additional updates.  I am continuing to work with billing department to see what needs to be done.  She is scheduled to come in next week to see Dr. Shipley and I let her know that if I do not call in the next couple of days, I will meet up with her when she sees Dr. Shipley.

## 2024-03-27 ENCOUNTER — HOSPITAL ENCOUNTER (OUTPATIENT)
Dept: PHYSICAL THERAPY | Age: 44
Setting detail: THERAPIES SERIES
Discharge: HOME | End: 2024-03-27

## 2024-03-27 PROCEDURE — 97110 THERAPEUTIC EXERCISES: CPT | Performed by: PHYSICAL THERAPY ASSISTANT

## 2024-03-27 NOTE — FLOWSHEET NOTE
[facility]- Outpatient Rehabilitation and Therapy [address], [place] [address] office: [phone] fax: [phone]      Physical Therapy: TREATMENT/PROGRESS NOTE   Patient: Kris Elizalde (43 y.o. female)   Treatment Date: 2024   :  1980 MRN: [id number]   Visit #: 31   Insurance Allowable Auth Needed   25 []Yes    [x]No    Insurance: Payor: / $0 copay/25 vpcy/no auth  Insurance ID: [id number] - ([facility])  Secondary Insurance (if applicable):    Treatment Diagnosis:   1. Right knee pain, unspecified chronicity  M25.561         2. Arthrofibrosis of knee joint, right  M24.661         3. Edema of knee  M25.469         4. Gait abnormality  R26.9         5. 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                             Plan of care signed (Y/N):     Date of Patient follow up with Physician:      Progress Report/POC: Date Range for this report: 23-3/8/24  POC update due: 2024 (OR 10 visits /OR AUTH LIMITS, whichever is less)    Patient seen in consultation with Dr. Shipley who established the initial/subsequent treatment protocol.   24: seen by Frank- d/c gabapentin secondary to nausea/dizziness.  Begin MDP.  Contact Dr Rivera for early consult.  24: ok to remove bandage and sutures, will monitor swelling  24: seen by Dr Shipley-begin 10mg prednisone. Pt seeing Dr Rivera on Thursday.  Continue focus on increased flexion ROM.  Consider SAURABH in 2 weeks if ROM is not progressing well.  24: seen by Frank-planning to schedule SAURABH for next week.  Pt to be placed on a lower dose of Celebrex.  24: seen by Dr Shipley-extensive adhesions were noted along the medial border of the patella.  Able to reach 90 degrees of flexion with the procedure.  Patient

## 2024-03-28 ENCOUNTER — HOSPITAL ENCOUNTER (OUTPATIENT)
Dept: PHYSICAL THERAPY | Age: 44
Setting detail: THERAPIES SERIES
Discharge: HOME | End: 2024-03-28

## 2024-03-28 ENCOUNTER — OFFICE VISIT (OUTPATIENT)
Dept: ORTHOPEDIC SURGERY | Age: 44
End: 2024-03-28

## 2024-03-28 DIAGNOSIS — M24.661 ARTHROFIBROSIS OF KNEE JOINT, RIGHT: Primary | ICD-10-CM

## 2024-03-28 DIAGNOSIS — M25.561 CHRONIC PAIN OF RIGHT KNEE: ICD-10-CM

## 2024-03-28 DIAGNOSIS — M25.461 SWELLING OF JOINT OF RIGHT KNEE: ICD-10-CM

## 2024-03-28 DIAGNOSIS — G89.29 CHRONIC PAIN OF RIGHT KNEE: ICD-10-CM

## 2024-03-28 PROCEDURE — 99024 POSTOP FOLLOW-UP VISIT: CPT | Performed by: PHYSICIAN ASSISTANT

## 2024-03-28 PROCEDURE — 97110 THERAPEUTIC EXERCISES: CPT | Performed by: PHYSICAL THERAPIST

## 2024-03-28 NOTE — PLAN OF CARE
Language for Healthcare:   [x]English       []other:    SUBJECTIVE EXAMINATION     Patient Report/Comments:  Taking prednisone BID, methotrexate injections weekly, had infusion for RA. Swelling is still present especially increases after 10 minutes.  Currently has the ERMI, however this will be gone after tomorrow.  She does have interest in purchasing the MotionRestore device.  She has been going to therapy 1x/week.  She is compliant with HEP.  Working on ROM 5-6x/day.  Saw Dr Shipley today.  He is currently discussing a possible SAURABH and PRP injection while under anesthesia.  He would like to run a culture for possible infection as well.  No date is currently set for SAURABH and PRP.     Current meds:  methotrexate, 10mg prednisone, folic acid, infusion therapy       Test used Initial score  10/26/23 01/09/2024 01/11/24 2/14/24   Pain Summary VAS 4/10 6-7/10 6-7/10 6-7/10   Functional questionnaire LEFS 74% deficit 76% deficit 100% deficit    Other:                    OBJECTIVE EXAMINATION     Observation:     Test measurements:     ROM/Strength: (Blank cells denote NT)    3/28/24      AROM L AROM R Notes PROM L PROM R Notes                        LUMBAR Flexion              Extension              Sidebend                Rotation                                       HIP Flexion                Abduction                ER                IR                Extension                                 KNEE Flexion 128 68 cold     92°ERMI  92 best      Extension +2 -5 cold  0 stretch                                ANKLE Dorsiflexion                Plantarflexion                Inversion                Eversion                             MMT L                MMT  R Notes      LUMBAR Flexion        Extension        Lateral flexion          Rotation                MMT L MMT R Notes         HIP Flexion          Abduction          ER          IR          Extension                     KNEE Flexion          Extension good Fair

## 2024-03-29 ENCOUNTER — APPOINTMENT (OUTPATIENT)
Dept: PHYSICAL THERAPY | Age: 44
End: 2024-03-29
Payer: COMMERCIAL

## 2024-04-01 NOTE — PROGRESS NOTES
Kris Elizalde is a healthy and well appearing 43 y.o. year old female who is sitting comfortably in our office in no acute distress.   Neuro: Alert & Oriented x 3,  normal,  no focal deficits noted. Normal mood & affect.  Eyes: Sclera clear  Ears: Normal external ear  Pulm: Respirations unlabored and regular   Skin: Warm, well perfused      Knee Examination: Right    Inspection: Well-healed scar from prior surgeries.  Moderate right knee soft tissue swelling.  No effusion, ecchymosis, discoloration, erythema, excessive warmth. no gross deformities, no signs of infection.     Palpation: There is no patellofemoral crepitus, there is joint line tenderness.  Tenderness with patellar mobility.  No other osseous or soft tissue tenderness.  Negative calf tenderness    Range of Motion:  0-80 degrees.  Mildly decreased appropriate patellar mobility.    Strength: Grossly intact    Special Tests:  No ligament instability, valgus and varus stress test are stable at 0 and 30°.   Negative Homans sign    Gait: antalgic, without the use of assistive devices    Alignment: Neutral    Neuro: Sensation equal bilateral lower extremities    Vascular: 2+ posterior tibialis pulse          Radiology:   Previously obtain imaging reviewed.  No new images obtained.       Assessment: This patient is a 43 y.o. female presenting to the office for a postop check.  This patient is being treated for arthrofibrosis in her right knee.    Encounter Diagnoses   Name Primary?    Arthrofibrosis of knee joint, right Yes    Chronic pain of right knee     Swelling of joint of right knee            Medical decision making:  Patient's workup and evaluation were reviewed with the patient in the office today.  Imaging was reviewed with the patient.  Given the patient's history and physical exam we believe with a reasonable degree of medical certainty that the most appropriate treatment option for this patient is for her to undergo another manipulation under

## 2024-04-04 ENCOUNTER — TELEPHONE (OUTPATIENT)
Dept: ORTHOPEDIC SURGERY | Age: 44
End: 2024-04-04

## 2024-04-04 NOTE — TELEPHONE ENCOUNTER
Auth: NPR  Date: 04/10/24  Reference # [id number]  Spoke with: Online  Type of SX:Outpatient  Location: [facility]  CPT: 53552    DX: M24.661   SX area: RIGHT KNEE  Insurance: [employer]

## 2024-04-15 ENCOUNTER — TELEPHONE (OUTPATIENT)
Dept: ORTHOPEDIC SURGERY | Age: 44
End: 2024-04-15

## 2024-04-15 NOTE — TELEPHONE ENCOUNTER
I been in contact with the patient who states that she needs approval for more therapy visits.  She can you contact information to contact the patient's insurance company.  I was able to speak with a representative named Amisha today at approximately 5:00.  Reference number for the call [id number].     Amisha informed me that the patient has a hard max of 25 physical therapy visits per year.  There is no way to appeal or ask for more therapy visits.  The patient informs me that letters can be submitted to attempt to get approval for more visits however I was informed by the representative from her insurance company that this is not a possibility and that after 25 visits the patient is on the road to pay out-of-pocket for additional therapy.  I have updated the patient about my discussion with her insurance company.            Frank Connell PA-C    Physician Assistant - Certified  Orthopedic Surgery   [facility]    04/15/24 5:17 PM

## 2024-04-22 ENCOUNTER — HOSPITAL ENCOUNTER (OUTPATIENT)
Dept: PHYSICAL THERAPY | Age: 44
Setting detail: THERAPIES SERIES
Discharge: HOME | End: 2024-04-22
Payer: COMMERCIAL

## 2024-04-22 ENCOUNTER — ANESTHESIA EVENT (OUTPATIENT)
Dept: POSTOP/PACU | Age: 44
End: 2024-04-22
Payer: COMMERCIAL

## 2024-04-22 ENCOUNTER — ANESTHESIA (OUTPATIENT)
Dept: POSTOP/PACU | Age: 44
End: 2024-04-22
Payer: COMMERCIAL

## 2024-04-22 ENCOUNTER — HOSPITAL ENCOUNTER (OUTPATIENT)
Dept: POSTOP/PACU | Age: 44
Discharge: HOME | End: 2024-04-22
Payer: COMMERCIAL

## 2024-04-22 VITALS
OXYGEN SATURATION: 97 % | SYSTOLIC BLOOD PRESSURE: 122 MMHG | HEART RATE: 74 BPM | BODY MASS INDEX: 32.8 KG/M2 | TEMPERATURE: 97.2 F | WEIGHT: 209 LBS | RESPIRATION RATE: 10 BRPM | DIASTOLIC BLOOD PRESSURE: 82 MMHG | HEIGHT: 67 IN

## 2024-04-22 DIAGNOSIS — M24.661 ARTHROFIBROSIS OF KNEE JOINT, RIGHT: Primary | ICD-10-CM

## 2024-04-22 PROCEDURE — 7100000000 HC PACU RECOVERY - FIRST 15 MIN: Performed by: ANESTHESIOLOGY

## 2024-04-22 PROCEDURE — 6360000002 HC RX W HCPCS: Performed by: ORTHOPAEDIC SURGERY

## 2024-04-22 PROCEDURE — 7100000010 HC PHASE II RECOVERY - FIRST 15 MIN: Performed by: ANESTHESIOLOGY

## 2024-04-22 PROCEDURE — C9290 INJ, BUPIVACAINE LIPOSOME: HCPCS | Performed by: ANESTHESIOLOGY

## 2024-04-22 PROCEDURE — 7100000011 HC PHASE II RECOVERY - ADDTL 15 MIN: Performed by: ANESTHESIOLOGY

## 2024-04-22 PROCEDURE — 6360000002 HC RX W HCPCS: Performed by: ANESTHESIOLOGY

## 2024-04-22 PROCEDURE — 3700000001 HC ADD 15 MINUTES (ANESTHESIA): Performed by: ANESTHESIOLOGY

## 2024-04-22 PROCEDURE — 7100000001 HC PACU RECOVERY - ADDTL 15 MIN: Performed by: ANESTHESIOLOGY

## 2024-04-22 PROCEDURE — 97110 THERAPEUTIC EXERCISES: CPT | Performed by: PHYSICAL THERAPIST

## 2024-04-22 PROCEDURE — 6360000002 HC RX W HCPCS: Performed by: NURSE ANESTHETIST, CERTIFIED REGISTERED

## 2024-04-22 PROCEDURE — 3700000000 HC ANESTHESIA ATTENDED CARE: Performed by: ANESTHESIOLOGY

## 2024-04-22 PROCEDURE — 64447 NJX AA&/STRD FEMORAL NRV IMG: CPT | Performed by: ANESTHESIOLOGY

## 2024-04-22 PROCEDURE — 2580000003 HC RX 258: Performed by: ANESTHESIOLOGY

## 2024-04-22 PROCEDURE — 3600000500 HC JOINT MANIPULATION: Performed by: PHYSICAL THERAPIST

## 2024-04-22 RX ORDER — BUPIVACAINE HYDROCHLORIDE 5 MG/ML
INJECTION, SOLUTION EPIDURAL; INTRACAUDAL
Status: COMPLETED
Start: 2024-04-22 | End: 2024-04-22

## 2024-04-22 RX ORDER — HYDROMORPHONE HYDROCHLORIDE 1 MG/ML
0.5 INJECTION, SOLUTION INTRAMUSCULAR; INTRAVENOUS; SUBCUTANEOUS EVERY 5 MIN PRN
Status: DISCONTINUED | OUTPATIENT
Start: 2024-04-22 | End: 2024-04-23 | Stop reason: HOSPADM

## 2024-04-22 RX ORDER — SODIUM CHLORIDE 0.9 % (FLUSH) 0.9 %
5-40 SYRINGE (ML) INJECTION EVERY 12 HOURS SCHEDULED
Status: DISCONTINUED | OUTPATIENT
Start: 2024-04-22 | End: 2024-04-23 | Stop reason: HOSPADM

## 2024-04-22 RX ORDER — PROCHLORPERAZINE EDISYLATE 5 MG/ML
5 INJECTION INTRAMUSCULAR; INTRAVENOUS
Status: COMPLETED | OUTPATIENT
Start: 2024-04-22 | End: 2024-04-22

## 2024-04-22 RX ORDER — SODIUM CHLORIDE, SODIUM LACTATE, POTASSIUM CHLORIDE, CALCIUM CHLORIDE 600; 310; 30; 20 MG/100ML; MG/100ML; MG/100ML; MG/100ML
INJECTION, SOLUTION INTRAVENOUS CONTINUOUS
Status: DISCONTINUED | OUTPATIENT
Start: 2024-04-22 | End: 2024-04-23 | Stop reason: HOSPADM

## 2024-04-22 RX ORDER — ROPIVACAINE HYDROCHLORIDE 5 MG/ML
10 INJECTION, SOLUTION EPIDURAL; INFILTRATION; PERINEURAL ONCE
Status: COMPLETED | OUTPATIENT
Start: 2024-04-22 | End: 2024-04-22

## 2024-04-22 RX ORDER — MIDAZOLAM HYDROCHLORIDE 1 MG/ML
INJECTION INTRAMUSCULAR; INTRAVENOUS PRN
Status: DISCONTINUED | OUTPATIENT
Start: 2024-04-22 | End: 2024-04-22 | Stop reason: SDUPTHER

## 2024-04-22 RX ORDER — NALOXONE HYDROCHLORIDE 0.4 MG/ML
INJECTION, SOLUTION INTRAMUSCULAR; INTRAVENOUS; SUBCUTANEOUS PRN
Status: DISCONTINUED | OUTPATIENT
Start: 2024-04-22 | End: 2024-04-23 | Stop reason: HOSPADM

## 2024-04-22 RX ORDER — KETOROLAC TROMETHAMINE 30 MG/ML
INJECTION, SOLUTION INTRAMUSCULAR; INTRAVENOUS PRN
Status: DISCONTINUED | OUTPATIENT
Start: 2024-04-22 | End: 2024-04-22 | Stop reason: SDUPTHER

## 2024-04-22 RX ORDER — FENTANYL CITRATE 50 UG/ML
INJECTION, SOLUTION INTRAMUSCULAR; INTRAVENOUS PRN
Status: DISCONTINUED | OUTPATIENT
Start: 2024-04-22 | End: 2024-04-22 | Stop reason: SDUPTHER

## 2024-04-22 RX ORDER — LABETALOL HYDROCHLORIDE 5 MG/ML
10 INJECTION, SOLUTION INTRAVENOUS
Status: DISCONTINUED | OUTPATIENT
Start: 2024-04-22 | End: 2024-04-23 | Stop reason: HOSPADM

## 2024-04-22 RX ORDER — LIDOCAINE HYDROCHLORIDE 20 MG/ML
INJECTION, SOLUTION INTRAVENOUS PRN
Status: DISCONTINUED | OUTPATIENT
Start: 2024-04-22 | End: 2024-04-22 | Stop reason: SDUPTHER

## 2024-04-22 RX ORDER — PROPOFOL 10 MG/ML
INJECTION, EMULSION INTRAVENOUS PRN
Status: DISCONTINUED | OUTPATIENT
Start: 2024-04-22 | End: 2024-04-22 | Stop reason: SDUPTHER

## 2024-04-22 RX ORDER — TRAMADOL HYDROCHLORIDE 50 MG/1
50 TABLET ORAL EVERY 4 HOURS PRN
Qty: 30 TABLET | Refills: 0 | Status: ON HOLD | OUTPATIENT
Start: 2024-04-22 | End: 2024-04-27

## 2024-04-22 RX ORDER — BUPIVACAINE HYDROCHLORIDE 5 MG/ML
INJECTION, SOLUTION EPIDURAL; INTRACAUDAL
Status: COMPLETED | OUTPATIENT
Start: 2024-04-22 | End: 2024-04-22

## 2024-04-22 RX ORDER — SODIUM CHLORIDE 9 MG/ML
INJECTION, SOLUTION INTRAVENOUS PRN
Status: DISCONTINUED | OUTPATIENT
Start: 2024-04-22 | End: 2024-04-23 | Stop reason: HOSPADM

## 2024-04-22 RX ORDER — FENTANYL CITRATE 50 UG/ML
25 INJECTION, SOLUTION INTRAMUSCULAR; INTRAVENOUS EVERY 5 MIN PRN
Status: DISCONTINUED | OUTPATIENT
Start: 2024-04-22 | End: 2024-04-23 | Stop reason: HOSPADM

## 2024-04-22 RX ORDER — SODIUM CHLORIDE 0.9 % (FLUSH) 0.9 %
5-40 SYRINGE (ML) INJECTION PRN
Status: DISCONTINUED | OUTPATIENT
Start: 2024-04-22 | End: 2024-04-23 | Stop reason: HOSPADM

## 2024-04-22 RX ADMIN — LIDOCAINE HYDROCHLORIDE 100 MG: 20 INJECTION, SOLUTION INTRAVENOUS at 08:05

## 2024-04-22 RX ADMIN — SODIUM CHLORIDE, POTASSIUM CHLORIDE, SODIUM LACTATE AND CALCIUM CHLORIDE: 600; 310; 30; 20 INJECTION, SOLUTION INTRAVENOUS at 07:21

## 2024-04-22 RX ADMIN — ROPIVACAINE HYDROCHLORIDE 10 ML: 5 INJECTION EPIDURAL; INFILTRATION; PERINEURAL at 08:10

## 2024-04-22 RX ADMIN — PROPOFOL 50 MG: 10 INJECTION, EMULSION INTRAVENOUS at 08:07

## 2024-04-22 RX ADMIN — MIDAZOLAM HYDROCHLORIDE 2 MG: 2 INJECTION, SOLUTION INTRAMUSCULAR; INTRAVENOUS at 08:05

## 2024-04-22 RX ADMIN — PROCHLORPERAZINE EDISYLATE 5 MG: 5 INJECTION INTRAMUSCULAR; INTRAVENOUS at 09:05

## 2024-04-22 RX ADMIN — PROPOFOL 50 MG: 10 INJECTION, EMULSION INTRAVENOUS at 08:05

## 2024-04-22 RX ADMIN — PROPOFOL 30 MG: 10 INJECTION, EMULSION INTRAVENOUS at 08:09

## 2024-04-22 RX ADMIN — FENTANYL CITRATE 25 MCG: 50 INJECTION, SOLUTION INTRAMUSCULAR; INTRAVENOUS at 08:08

## 2024-04-22 RX ADMIN — BUPIVACAINE HYDROCHLORIDE 30 ML: 5 INJECTION, SOLUTION EPIDURAL; INTRACAUDAL; PERINEURAL at 08:17

## 2024-04-22 RX ADMIN — BUPIVACAINE 10 ML: 13.3 INJECTION, SUSPENSION, LIPOSOMAL INFILTRATION at 08:17

## 2024-04-22 RX ADMIN — KETOROLAC TROMETHAMINE 30 MG: 30 INJECTION, SOLUTION INTRAMUSCULAR; INTRAVENOUS at 08:10

## 2024-04-22 RX ADMIN — FENTANYL CITRATE 25 MCG: 50 INJECTION, SOLUTION INTRAMUSCULAR; INTRAVENOUS at 08:05

## 2024-04-22 ASSESSMENT — PAIN - FUNCTIONAL ASSESSMENT
PAIN_FUNCTIONAL_ASSESSMENT: PREVENTS OR INTERFERES SOME ACTIVE ACTIVITIES AND ADLS
PAIN_FUNCTIONAL_ASSESSMENT: PREVENTS OR INTERFERES SOME ACTIVE ACTIVITIES AND ADLS
PAIN_FUNCTIONAL_ASSESSMENT: 0-10

## 2024-04-22 ASSESSMENT — PAIN SCALES - GENERAL
PAINLEVEL_OUTOF10: 0
PAINLEVEL_OUTOF10: 4
PAINLEVEL_OUTOF10: 0

## 2024-04-22 ASSESSMENT — PAIN DESCRIPTION - DESCRIPTORS
DESCRIPTORS: ACHING
DESCRIPTORS: ACHING

## 2024-04-22 ASSESSMENT — PAIN DESCRIPTION - ORIENTATION: ORIENTATION: RIGHT

## 2024-04-22 ASSESSMENT — PAIN DESCRIPTION - FREQUENCY: FREQUENCY: CONTINUOUS

## 2024-04-22 ASSESSMENT — PAIN DESCRIPTION - LOCATION: LOCATION: KNEE

## 2024-04-22 ASSESSMENT — PAIN DESCRIPTION - ONSET: ONSET: ON-GOING

## 2024-04-22 ASSESSMENT — PAIN DESCRIPTION - PAIN TYPE: TYPE: ACUTE PAIN

## 2024-04-22 ASSESSMENT — LIFESTYLE VARIABLES: SMOKING_STATUS: 0

## 2024-04-22 NOTE — PROGRESS NOTES
Spoke with Jaden Tonio, patients spouse, to discuss patients discharge instructions.  Mr Elizalde stated he has received no calls from Dr. Shipley call placed to Dr. Shipley to contact  Tonio.

## 2024-04-22 NOTE — ANESTHESIA PRE PROCEDURE
Department of Anesthesiology  Preprocedure Note       Name:  Kris Elizalde   Age:  43 y.o.  :  1980                                          MRN:  [id number]         Date:  2024      Surgeon: * No surgeons listed *    Procedure: * No procedures listed *    Medications prior to admission:   Prior to Admission medications    Medication Sig Start Date End Date Taking? Authorizing Provider   predniSONE (DELTASONE) 5 MG tablet Take 4 tablets by mouth 2 times daily 3/12/24   Yunior Liang MD   BD DISP NEEDLES 27G X 1/2\" MISC USE WEEKLY TO INJECT METHOTREXATE 3/16/24   Yunior Liang MD   BD DISP NEEDLES 18G X 1-/2\" MISC USE TO DRAW INJECTION WEEKLY 3/12/24   Yunior Liang MD   B-KIRK SYRINGE LUER-SUKHWINDER 1CC 1 ML MISC USE WEEKLY TO INJECT METHOTREXATE 3/15/24   Yunior Liang MD   methotrexate Sodium (RHEUMATREX) 250 MG/10ML SOLN chemo injection ADMINISTER 0.6 ML UNDER THE SKIN EVERY 7 DAYS 3/13/24   Yunior Liang MD   folic acid (FOLVITE) 1 MG tablet  24   Yunior Liang MD   senna (SENOKOT) 8.6 MG TABS tablet Take 1 tablet by mouth daily 24   Jaylan Wheat MD   aspirin 325 MG EC tablet Take 1 tablet by mouth in the morning and at bedtime 24   Alfredo Chang DO   acetaminophen (TYLENOL) 325 MG tablet Take 2 tablets by mouth every 6 hours 24   Alfredo Chang DO   ascorbic acid (V-R VITAMIN C) 250 MG tablet Take 1 tablet by mouth daily    Yunior Liang MD   Magnesium Oxide (MAGNESIUM-OXIDE) 250 MG TABS tablet Take by mouth 20   Yunior Liang MD   Naltrexone HCl, Pain, 1.5 MG CAPS  23   Yunior Liang MD   nortriptyline (PAMELOR) 25 MG capsule Take 1 capsule by mouth nightly 23   Yunior Liang MD   Cholecalciferol (VITAMIN D-3) 25 MCG (1000 UT) CAPS Vitamin D-3    Yunior Liang MD   golimumab (SIMPONI ARIA) 50 MG/4ML SOLN  23   Yunior Liang MD       Current medications:    Current

## 2024-04-22 NOTE — PROGRESS NOTES
Ambulatory Surgery/Procedure Discharge Note    Vitals:    04/22/24 0915   BP: 122/82   Pulse: 74   Resp: 10   Temp: 97.2 °F (36.2 °C)   SpO2: 97%       In: 800 [I.V.:800]  Out: -     Restroom use offered before discharge.  Yes    Pain assessment:  level of pain (1-10, 10 severe),   Pain Level: 0    Patient discharged to physical therapy with friend.      Patient discharged to home/self care. Patient discharged via wheel chair by transporter to waiting family/S.O.       4/22/2024 10:09 AM

## 2024-04-22 NOTE — DISCHARGE INSTRUCTIONS
INSTRUCTIONS     Please remember while having a nerve block you are at an increased risk for BALANCE ISSUES AND FALLS  You were given a nerve block today from the anesthesiologist. Most nerve blocks last anywhere from 6-36 hours.  You should start taking your pain medication before the block wears off or when you first begin feeling discomfort. It takes at least 30-60 minutes for a pain pill to take effect. Pain medications should be taken with food.  Consider setting an alarm through the night to help manage your pain level so you do not wake up with too much pain.   Pain medicines can cause more sedation and decrease your breathing so ONLY take as directed. If you have Sleep Apnea, you need to use your C-Pap machine.   What to expect after a nerve block:    Numbness, tingling- affected area feels heavy or asleep   Weakness or inability to move or control your affected area   Inability to feel temperature changes to your affected area  Usually the weakness wears off first, followed by the tingling or heaviness. You may notice more pain at this point and should start taking your pain meds.   If you had a shoulder block, you may experience:   Mild shortness of breath (may be relieved by sitting up in a chair or recliner)   Hoarse voice   Blurry vision   Unequal pupils   Drooping of your face (eye or lip) on the same side as the nerve block   Swelling at the injection site on the side of your neck  These side effects should resolve as the block wears off   IF you have severe or prolonged shortness of breath- GO to the nearest Emergency Room  If you had a nerve block of your arm or leg:   Protect the arm or leg from extreme hot or cold temperatures   Protect the arm or leg from obstructing blood flow by frequent position changes- Make sure fingers/ toes stay pink and warm. Call surgeon with any changes   For leg block, get assistance walking until the block wears off, i.e.:  crutches, walker, support person    If you

## 2024-04-22 NOTE — PROGRESS NOTES
Pt states very difficult IV stick, usually requires use of ultrasound for upper arm especially when needs large amount blood drawn. Called Frank FERNANDEZ for special syringe to draw 60cc's blood preop.  0645 Still waiting for syringe prior to IV start, Warm blankets in place to arms.  0700 PA here with syringe ready for blood draw, Chris DURAN with ultrasound for IV start.  0710 IV placed 1 stick #20 gauge, pt tolerated very well, blood obtained given to Frank FERNANDEZ to spin.

## 2024-04-22 NOTE — ANESTHESIA POSTPROCEDURE EVALUATION
Department of Anesthesiology  Postprocedure Note    Patient: Kris Elizalde  MRN: [id number]  YOB: 1980  Date of evaluation: 4/22/2024    Procedure Summary       Date: 04/22/24 Room / Location: [facility] PACU    Anesthesia Start: 0804 Anesthesia Stop: 0821    Procedure: JOINT MANIPULATION Diagnosis:     Scheduled Providers: Portillo Britt DO Responsible Provider: Portillo Britt DO    Anesthesia Type: MAC ASA Status: 2            Anesthesia Type: No value filed.    Elpidio Phase I: Elpidio Score: 10    Elpidio Phase II: Elpidio Score: 10    Vitals:    04/22/24 0915   BP: 122/82   Pulse: 74   Resp: 10   Temp: 97.2 °F (36.2 °C)   SpO2: 97%       Anesthesia Post Evaluation    Patient location during evaluation: bedside  Patient participation: complete - patient participated  Level of consciousness: awake and awake and alert  Pain score: 1  Airway patency: patent  Nausea & Vomiting: no nausea and no vomiting  Cardiovascular status: hemodynamically stable  Respiratory status: acceptable  Hydration status: euvolemic  Pain management: adequate and satisfactory to patient        No notable events documented.

## 2024-04-22 NOTE — PLAN OF CARE
Met: [] Adjusted    Overall Progression Towards Functional goals/ Treatment Progress Update:  [] Patient is progressing as expected towards functional goals listed.    [x] Progression is slowed due to complexities/Impairments listed.  [] Progression has been slowed due to co-morbidities.  [] Plan just implemented, too soon (<30days) to assess goals progression   [] Goals require adjustment due to lack of progress  [] Patient is not progressing as expected and requires additional follow up with physician  [] Other:     CHARGE CAPTURE     CHARGE GRID   CPT Code (TIMED) minutes # CPT Code (UNTIMED) #     [x] Therex (94983)   2  [] EVAL:LOW (46534 - Typically 20 minutes face-to-face)      Neuromusc. Re-ed (28588)    [] Re-Eval (41274)     [] Manual (95525)    [] Estim Unattended (50800)     [] Ther. Act (56700)    [] Mech. Traction (32538)     [] Gait (99854)    [] Dry Needle 1-2 muscle (20560)     [] Aquatic Therex (18532)    [] Dry Needle 3+ muscle (20561)     [] Iontophoresis (93476)    [] VASO (84195)     [] Ultrasound (15975)    [] Group Therapy (06293)     [] Estim Attended (19368)    [] Other:    Total Timed Code Tx Minutes 33'        Total Treatment Minutes 10:20-11:23  63'        Charge Justification:  (43550) THERAPEUTIC EXERCISE - Provided verbal/tactile cueing for activities related to strengthening, flexibility, endurance, ROM performed to prevent loss of range of motion, maintain or improve muscular strength or increase flexibility, following either an injury or surgery.   (07565) HOME EXERCISE PROGRAM - Reviewed/Progressed HEP activities related to strengthening, flexibility, endurance, ROM performed to prevent loss of range of motion, maintain or improve muscular strength or increase flexibility, following either an injury or surgery.    TREATMENT PLAN   Plan: Cont POC- Continue emphasis/focus on exercise progression, modulating pain, promoting relaxation, increasing ROM, reduce/eliminate soft tissue

## 2024-04-22 NOTE — PROGRESS NOTES
PACU Transfer to [facility]    Vitals:    04/22/24 0900   BP: 128/78   Pulse: 68   Resp: 10   Temp: 97.1°F (36.2 °C)   SpO2: 97%         Intake/Output Summary (Last 24 hours) at 4/22/2024 0937  Last data filed at 4/22/2024 0818  Gross per 24 hour   Intake 800 ml   Output --   Net 800 ml       Pain assessment:  present - adequately treated  Pain Level: 0    Patient transferred to care of [facility] RN.    4/22/2024 9:37 AM

## 2024-04-22 NOTE — PROGRESS NOTES
0900 Pt CO of pain epic gastric area pointed to xiphoid process pain resolved on own in 2 min no change in cardiac ECG on monitor SR steady and regular Dr Britt notified no new new orders

## 2024-04-22 NOTE — PROGRESS NOTES
PATIENT INSTRUCTIONS    Treatment:  Medrol dose pack ordered take as directed  Icing 3 times a day for 20 minutes  Physical Therapy:  Continue Physical Therapy., Continue Home Exercise Program.   Continue to weight bear as tolerated with crutches  Continue patella mobilization and range of motion  Follow-Up:  Please make an appointment with Dr. Yuni Martinez in 1 weeks    Time left: 10/4/2021 9:46 AM     Next appointment: 10-11-21 @ 8:30     Location: [address] [address])       Provider:     Please note: 24 hour notice for cancellation of appointment is required.    You may receive a survey in the mail, or via the e-mail address that you have provided.  We would appreciate if you could fill out the survey and provide us with any feedback on your experience regarding your visit today. Thank you for allowing us to provide you with your health care needs.     Do not hesitate to call if you are experiencing severe pain, worsening or change in your pain, have symptoms of infection (fever, warmth, redness, increased drainage), or have any other problem that concerns you ~ [phone] (or [phone] after hours).    Please remember when requesting refills on pain medication that the request should be made by Thursday at the latest. [facility] is open Monday-Friday, 8am-5pm, and closed on the weekends.  No narcotic refills will be filled after hours.    Additional Educational Resources:  For additional resources regarding your symptoms, diagnosis, or further health information, please visit the Health Resources section on [email or web address] or the Online Health Resources section in [employer].     Patient admitted to PACU bed #  16  from [facility] @ 0805 in preperation for  Right Knee   manipulation and injection per Dr. Shipley.  Patient attached to PACU monitoring system.  Patient, procedure and site confirmed with Dr. Shipley 0804 and anesthesia provider at bedside.  Manipulation began at 0804 and ended at 0810.  Pre-procedure flexion recorded by Dr Shipley team post-procedure flexion post procedure  degree documented by Dr Shipley Anesthesia monitored VS's and maintained airway during procedure. Anesthesia start 0804 end time and hand off time 0821

## 2024-04-22 NOTE — FLOWSHEET NOTE
Dylan Sahu RN,  Dr Quinten Covarrubias CRNA all agree on Right Knee manipulation under anesthesia

## 2024-04-23 ENCOUNTER — HOSPITAL ENCOUNTER (OUTPATIENT)
Dept: PHYSICAL THERAPY | Age: 44
Setting detail: THERAPIES SERIES
Discharge: HOME | End: 2024-04-23
Payer: COMMERCIAL

## 2024-04-23 ENCOUNTER — APPOINTMENT (OUTPATIENT)
Dept: PHYSICAL THERAPY | Age: 44
End: 2024-04-23
Payer: COMMERCIAL

## 2024-04-23 PROCEDURE — 97110 THERAPEUTIC EXERCISES: CPT | Performed by: PHYSICAL THERAPIST

## 2024-04-23 NOTE — FLOWSHEET NOTE
the procedure.  Patient should hold on methotrexate.  No RA infusions at this time.  Hold on prednisone for 2 weeks. Continue with folic acid.  Begin anti-biotics today.  2/15/24:  seen by Dr Shipley-extensive discussion re: meds, expectations, future treatment options.  Discussed returning to RA meds per rheumatologist next week.  Pt was advised that she could return home for 1 week if she would like to.  She should return to [facility] the following week to continue with therapy once edema is better controlled.  Discussed possibility of PRP injection in the future.    2/27/24: seen by Dr Shipley- hold on 10 mg prednisone until completion of MDP.  Will consider PRP injection if steroid treatment presents as ineffective.  4/23/24: seen by Dr Shipley-still reaching a hard stop at 90 degrees during SAURABH.  May notice benefit from the PRP 1-3 weeks following the procedure.    Preferred Language for Healthcare:   [x]English       []other:    SUBJECTIVE EXAMINATION     Patient Report/Comments:  1 day s/p right knee SAURABH/PRP injection.  Reports that her pain is well managed.  She is decreasing intake of pain meds.  She was able to get her home program in 3x yesterday.  She does feel like her edema is relatively well controlled.  Tolerating her exercises well.        Current meds:  methotrexate, 10mg prednisone, folic acid, infusion therapy       Test used Initial score  10/26/23 01/09/2024 01/11/24 2/14/24   Pain Summary VAS 4/10 6-7/10 6-7/10 6-7/10   Functional questionnaire LEFS 74% deficit 76% deficit 100% deficit    Other:                    OBJECTIVE EXAMINATION     Observation:     Test measurements:     ROM/Strength: (Blank cells denote NT)    4/23/24      AROM L AROM R Notes PROM L PROM R Notes                        LUMBAR Flexion              Extension              Sidebend                Rotation                                       HIP Flexion                Abduction                ER                IR

## 2024-04-24 ENCOUNTER — HOSPITAL ENCOUNTER (OUTPATIENT)
Dept: PHYSICAL THERAPY | Age: 44
Setting detail: THERAPIES SERIES
Discharge: HOME | End: 2024-04-24
Payer: COMMERCIAL

## 2024-04-24 PROCEDURE — 97110 THERAPEUTIC EXERCISES: CPT | Performed by: PHYSICAL THERAPY ASSISTANT

## 2024-04-24 NOTE — FLOWSHEET NOTE
[facility]- Outpatient Rehabilitation and Therapy [address], [place] [address] office: [phone] fax: [phone]      Physical Therapy: TREATMENT/PROGRESS NOTE   Patient: Kris Elizalde (43 y.o. female)   Treatment Date: 2024   :  1980 MRN: [id number]   Visit #: 35   Insurance Allowable Auth Needed   25 []Yes    [x]No    Insurance: Payor: BCBS / Plan: BCBS OUT OF STATE / Product Type: *No Product type* / $0 copay/25 vpcy/no auth  Insurance ID: [id number] - ([place] BCBS)  Secondary Insurance (if applicable):    Treatment Diagnosis:   1. Right knee pain, unspecified chronicity  M25.561         2. Arthrofibrosis of knee joint, right  M24.661         3. Edema of knee  M25.469         4. Gait abnormality  R26.9         5. 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                             Plan of care signed (Y/N):     Date of Patient follow up with Physician:      Progress Report/POC: NO  POC update due: 2024 (OR 10 visits /OR AUTH LIMITS, whichever is less)    Patient seen in consultation with Dr. Shipley who established the initial/subsequent treatment protocol.   24: seen by Frank- d/c gabapentin secondary to nausea/dizziness.  Begin MDP.  Contact Dr Rivera for early consult.  24: ok to remove bandage and sutures, will monitor swelling  24: seen by Dr Shipley-begin 10mg prednisone. Pt seeing Dr Rivera on Thursday.  Continue focus on increased flexion ROM.  Consider SAURABH in 2 weeks if ROM is not progressing well.  24: seen by Frank-planning to schedule SAURABH for next week.  Pt to be placed on a lower dose of Celebrex.  24: seen by Dr Shipley-extensive adhesions were noted along the medial border of the patella.  Able to reach 90 degrees of flexion with

## 2024-04-25 ENCOUNTER — APPOINTMENT (OUTPATIENT)
Dept: MRI IMAGING | Age: 44
DRG: 093 | End: 2024-04-25
Payer: COMMERCIAL

## 2024-04-25 ENCOUNTER — HOSPITAL ENCOUNTER (OUTPATIENT)
Dept: PHYSICAL THERAPY | Age: 44
Setting detail: THERAPIES SERIES
Discharge: HOME | End: 2024-04-25
Payer: COMMERCIAL

## 2024-04-25 ENCOUNTER — HOSPITAL ENCOUNTER (INPATIENT)
Age: 44
LOS: 4 days | Discharge: HOME | DRG: 093 | End: 2024-04-29
Attending: EMERGENCY MEDICINE | Admitting: INTERNAL MEDICINE
Payer: COMMERCIAL

## 2024-04-25 DIAGNOSIS — M54.10 RADICULOPATHY, UNSPECIFIED SPINAL REGION: Primary | ICD-10-CM

## 2024-04-25 DIAGNOSIS — M24.661 ARTHROFIBROSIS OF KNEE JOINT, RIGHT: Primary | ICD-10-CM

## 2024-04-25 DIAGNOSIS — M24.661 ARTHROFIBROSIS OF KNEE JOINT, RIGHT: ICD-10-CM

## 2024-04-25 PROBLEM — R20.0 LEFT LEG NUMBNESS: Status: ACTIVE | Noted: 2024-04-25

## 2024-04-25 PROBLEM — R20.0 RIGHT LEG NUMBNESS: Status: ACTIVE | Noted: 2024-04-25

## 2024-04-25 LAB
ANION GAP SERPL CALCULATED.3IONS-SCNC: 12 MMOL/L (ref 3–16)
BASOPHILS # BLD: 0.1 K/UL (ref 0–0.2)
BASOPHILS NFR BLD: 0.9 %
BUN SERPL-MCNC: 7 MG/DL (ref 7–20)
CALCIUM SERPL-MCNC: 9.4 MG/DL (ref 8.3–10.6)
CHLORIDE SERPL-SCNC: 100 MMOL/L (ref 99–110)
CO2 SERPL-SCNC: 26 MMOL/L (ref 21–32)
CREAT SERPL-MCNC: 0.7 MG/DL (ref 0.6–1.1)
DEPRECATED RDW RBC AUTO: 15.8 % (ref 12.4–15.4)
EOSINOPHIL # BLD: 0.1 K/UL (ref 0–0.6)
EOSINOPHIL NFR BLD: 1.6 %
GFR SERPLBLD CREATININE-BSD FMLA CKD-EPI: >90 ML/MIN/{1.73_M2}
GLUCOSE SERPL-MCNC: 102 MG/DL (ref 70–99)
HCT VFR BLD AUTO: 39 % (ref 36–48)
HGB BLD-MCNC: 12.8 G/DL (ref 12–16)
LYMPHOCYTES # BLD: 2.1 K/UL (ref 1–5.1)
LYMPHOCYTES NFR BLD: 26.5 %
MAGNESIUM SERPL-MCNC: 1.8 MG/DL (ref 1.8–2.4)
MCH RBC QN AUTO: 29.9 PG (ref 26–34)
MCHC RBC AUTO-ENTMCNC: 33 G/DL (ref 31–36)
MCV RBC AUTO: 90.7 FL (ref 80–100)
MONOCYTES # BLD: 0.5 K/UL (ref 0–1.3)
MONOCYTES NFR BLD: 6.7 %
NEUTROPHILS # BLD: 5 K/UL (ref 1.7–7.7)
NEUTROPHILS NFR BLD: 64.3 %
PHOSPHATE SERPL-MCNC: 3 MG/DL (ref 2.5–4.9)
PLATELET # BLD AUTO: 348 K/UL (ref 135–450)
PMV BLD AUTO: 8.4 FL (ref 5–10.5)
POTASSIUM SERPL-SCNC: 4.2 MMOL/L (ref 3.5–5.1)
RBC # BLD AUTO: 4.29 M/UL (ref 4–5.2)
SODIUM SERPL-SCNC: 138 MMOL/L (ref 136–145)
WBC # BLD AUTO: 7.8 K/UL (ref 4–11)

## 2024-04-25 PROCEDURE — 1200000000 HC SEMI PRIVATE

## 2024-04-25 PROCEDURE — 85025 COMPLETE CBC W/AUTO DIFF WBC: CPT

## 2024-04-25 PROCEDURE — 36415 COLL VENOUS BLD VENIPUNCTURE: CPT

## 2024-04-25 PROCEDURE — 6360000004 HC RX CONTRAST MEDICATION: Performed by: EMERGENCY MEDICINE

## 2024-04-25 PROCEDURE — 72158 MRI LUMBAR SPINE W/O & W/DYE: CPT

## 2024-04-25 PROCEDURE — 97110 THERAPEUTIC EXERCISES: CPT | Performed by: PHYSICAL THERAPIST

## 2024-04-25 PROCEDURE — 2580000003 HC RX 258: Performed by: INTERNAL MEDICINE

## 2024-04-25 PROCEDURE — 99285 EMERGENCY DEPT VISIT HI MDM: CPT

## 2024-04-25 PROCEDURE — 84100 ASSAY OF PHOSPHORUS: CPT

## 2024-04-25 PROCEDURE — A9579 GAD-BASE MR CONTRAST NOS,1ML: HCPCS | Performed by: EMERGENCY MEDICINE

## 2024-04-25 PROCEDURE — 83735 ASSAY OF MAGNESIUM: CPT

## 2024-04-25 PROCEDURE — 80048 BASIC METABOLIC PNL TOTAL CA: CPT

## 2024-04-25 RX ORDER — POLYETHYLENE GLYCOL 3350 17 G/17G
17 POWDER, FOR SOLUTION ORAL DAILY PRN
Status: DISCONTINUED | OUTPATIENT
Start: 2024-04-25 | End: 2024-04-29 | Stop reason: HOSPADM

## 2024-04-25 RX ORDER — SODIUM CHLORIDE 0.9 % (FLUSH) 0.9 %
5-40 SYRINGE (ML) INJECTION PRN
Status: DISCONTINUED | OUTPATIENT
Start: 2024-04-25 | End: 2024-04-29 | Stop reason: HOSPADM

## 2024-04-25 RX ORDER — MAGNESIUM SULFATE IN WATER 40 MG/ML
2000 INJECTION, SOLUTION INTRAVENOUS PRN
Status: DISCONTINUED | OUTPATIENT
Start: 2024-04-25 | End: 2024-04-26

## 2024-04-25 RX ORDER — ACETAMINOPHEN 650 MG/1
650 SUPPOSITORY RECTAL EVERY 6 HOURS PRN
Status: DISCONTINUED | OUTPATIENT
Start: 2024-04-25 | End: 2024-04-29 | Stop reason: HOSPADM

## 2024-04-25 RX ORDER — ONDANSETRON 2 MG/ML
4 INJECTION INTRAMUSCULAR; INTRAVENOUS EVERY 6 HOURS PRN
Status: DISCONTINUED | OUTPATIENT
Start: 2024-04-25 | End: 2024-04-29 | Stop reason: HOSPADM

## 2024-04-25 RX ORDER — POTASSIUM CHLORIDE 7.45 MG/ML
10 INJECTION INTRAVENOUS PRN
Status: DISCONTINUED | OUTPATIENT
Start: 2024-04-25 | End: 2024-04-26

## 2024-04-25 RX ORDER — ACETAMINOPHEN 325 MG/1
650 TABLET ORAL EVERY 6 HOURS PRN
Status: DISCONTINUED | OUTPATIENT
Start: 2024-04-25 | End: 2024-04-29 | Stop reason: HOSPADM

## 2024-04-25 RX ORDER — ONDANSETRON 4 MG/1
4 TABLET, ORALLY DISINTEGRATING ORAL EVERY 8 HOURS PRN
Status: DISCONTINUED | OUTPATIENT
Start: 2024-04-25 | End: 2024-04-29 | Stop reason: HOSPADM

## 2024-04-25 RX ORDER — SODIUM CHLORIDE 0.9 % (FLUSH) 0.9 %
5-40 SYRINGE (ML) INJECTION EVERY 12 HOURS SCHEDULED
Status: DISCONTINUED | OUTPATIENT
Start: 2024-04-25 | End: 2024-04-29 | Stop reason: HOSPADM

## 2024-04-25 RX ORDER — POTASSIUM CHLORIDE 20 MEQ/1
40 TABLET, EXTENDED RELEASE ORAL PRN
Status: DISCONTINUED | OUTPATIENT
Start: 2024-04-25 | End: 2024-04-26

## 2024-04-25 RX ORDER — SODIUM CHLORIDE 9 MG/ML
INJECTION, SOLUTION INTRAVENOUS PRN
Status: DISCONTINUED | OUTPATIENT
Start: 2024-04-25 | End: 2024-04-29 | Stop reason: HOSPADM

## 2024-04-25 RX ORDER — FOLIC ACID 1 MG/1
1 TABLET ORAL DAILY
Status: DISCONTINUED | OUTPATIENT
Start: 2024-04-26 | End: 2024-04-29 | Stop reason: HOSPADM

## 2024-04-25 RX ORDER — METHYLPREDNISOLONE 4 MG/1
TABLET ORAL
Qty: 1 KIT | Refills: 0 | Status: ON HOLD | OUTPATIENT
Start: 2024-04-25

## 2024-04-25 RX ADMIN — GADOTERIDOL 20 ML: 279.3 INJECTION, SOLUTION INTRAVENOUS at 18:42

## 2024-04-25 RX ADMIN — SODIUM CHLORIDE, PRESERVATIVE FREE 10 ML: 5 INJECTION INTRAVENOUS at 23:29

## 2024-04-25 ASSESSMENT — PAIN DESCRIPTION - ORIENTATION
ORIENTATION: RIGHT
ORIENTATION: RIGHT

## 2024-04-25 ASSESSMENT — PAIN DESCRIPTION - LOCATION
LOCATION: KNEE
LOCATION: KNEE

## 2024-04-25 ASSESSMENT — PAIN DESCRIPTION - FREQUENCY: FREQUENCY: CONTINUOUS

## 2024-04-25 ASSESSMENT — PAIN DESCRIPTION - ONSET: ONSET: ON-GOING

## 2024-04-25 ASSESSMENT — PAIN DESCRIPTION - PAIN TYPE: TYPE: ACUTE PAIN

## 2024-04-25 ASSESSMENT — PAIN SCALES - GENERAL
PAINLEVEL_OUTOF10: 5
PAINLEVEL_OUTOF10: 5

## 2024-04-25 ASSESSMENT — PAIN DESCRIPTION - DESCRIPTORS
DESCRIPTORS: NUMBNESS
DESCRIPTORS: NUMBNESS

## 2024-04-25 ASSESSMENT — PAIN - FUNCTIONAL ASSESSMENT: PAIN_FUNCTIONAL_ASSESSMENT: PREVENTS OR INTERFERES WITH ALL ACTIVE AND SOME PASSIVE ACTIVITIES

## 2024-04-25 NOTE — FLOWSHEET NOTE
[facility]- Outpatient Rehabilitation and Therapy [address], [place] [address] office: [phone] fax: [phone]      Physical Therapy: TREATMENT/PROGRESS NOTE   Patient: Kris Elizalde (43 y.o. female)   Treatment Date: 2024   :  1980 MRN: [id number]   Visit #: 36   Insurance Allowable Auth Needed   25 []Yes    [x]No    Insurance: Payor: BCBS / Plan: BCBS OUT OF STATE / Product Type: *No Product type* / $0 copay/25 vpcy/no auth  Insurance ID: [id number] - ([place] [employer]BS)  Secondary Insurance (if applicable):    Treatment Diagnosis:   1. Right knee pain, unspecified chronicity  M25.561         2. Arthrofibrosis of knee joint, right  M24.661         3. Edema of knee  M25.469         4. Gait abnormality  R26.9         5. 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                             Plan of care signed (Y/N):     Date of Patient follow up with Physician:      Progress Report/POC: NO  POC update due: 2024 (OR 10 visits /OR AUTH LIMITS, whichever is less)    Patient seen in consultation with Dr. Shipley who established the initial/subsequent treatment protocol.   24: seen by Frank- d/c gabapentin secondary to nausea/dizziness.  Begin MDP.  Contact Dr Rivera for early consult.  24: ok to remove bandage and sutures, will monitor swelling  24: seen by Dr Shipley-begin 10mg prednisone. Pt seeing Dr Rivera on Thursday.  Continue focus on increased flexion ROM.  Consider SAURABH in 2 weeks if ROM is not progressing well.  24: seen by Frank-planning to schedule SAURABH for next week.  Pt to be placed on a lower dose of Celebrex.  24: seen by Dr Shipley-extensive adhesions were noted along the medial border of the patella.  Able to reach 90 degrees of flexion with

## 2024-04-25 NOTE — ED PROVIDER NOTES
THE [facility]  EMERGENCY DEPARTMENT ENCOUNTER          ATTENDING PHYSICIAN NOTE       Date of evaluation: 4/25/2024    Chief Complaint     Numbness (Patient comes to the ED today for numbness on the R leg. Patient had a nerve block on Monday and wore off Tuesday and the numbness progress on the R leg on Wed till today. )      History of Present Illness     Kris Elizalde is a 43 y.o. female who presents to the emergency department with a complaint of numbness in the right leg.  On 4/22 the patient underwent a right femoral nerve block as a component of a manipulation under anesthesia of her right knee.  Patient states that after this procedure the nerve block had worn off but she continues to have some numbness in the right leg that has been progressive and has been according to her PT also associated with some mild quadriceps weakness.  She denies any significant pain that is new associated with this.  Denies any bowel or bladder issues.  She does have a history of lumbar spinal disc herniation issues.    ASSESSMENT / PLAN  (MEDICAL DECISION MAKING)     INITIAL VITALS: BP: 121/73, Temp: 97.6 °F (36.4 °C), Pulse: 87, Respirations: 25, SpO2: 100 %      Kris Elizalde is a 43 y.o. female who presented to the Emergency Department with concerns for right lower extremity numbness and weakness.  The patient is status post a manipulation under anesthesia of the right knee and has a component of that had a right femoral nerve block.  Also has a history of lumbar spinal disc disease.  Presented with increasing symptoms of right lower extremity numbness.  On examination had light touch sensitivity loss primarily over the lower leg right anterior thigh also reports some on the right posterior thigh with 3+ out of 5 strength throughout the right lower extremity.  Given the patient's history of prior lumbar disc disease was concern for the possibility of acute herniation versus complication from her femoral nerve block versus

## 2024-04-26 ENCOUNTER — APPOINTMENT (OUTPATIENT)
Dept: PHYSICAL THERAPY | Age: 44
End: 2024-04-26
Payer: COMMERCIAL

## 2024-04-26 LAB
CRP SERPL-MCNC: 5.6 MG/L (ref 0–5.1)
ERYTHROCYTE [SEDIMENTATION RATE] IN BLOOD BY WESTERGREN METHOD: 14 MM/HR (ref 0–20)

## 2024-04-26 PROCEDURE — 1200000000 HC SEMI PRIVATE

## 2024-04-26 PROCEDURE — 6370000000 HC RX 637 (ALT 250 FOR IP): Performed by: INTERNAL MEDICINE

## 2024-04-26 PROCEDURE — 2580000003 HC RX 258: Performed by: INTERNAL MEDICINE

## 2024-04-26 PROCEDURE — 86140 C-REACTIVE PROTEIN: CPT

## 2024-04-26 PROCEDURE — 85652 RBC SED RATE AUTOMATED: CPT

## 2024-04-26 PROCEDURE — 36415 COLL VENOUS BLD VENIPUNCTURE: CPT

## 2024-04-26 PROCEDURE — 6360000002 HC RX W HCPCS: Performed by: STUDENT IN AN ORGANIZED HEALTH CARE EDUCATION/TRAINING PROGRAM

## 2024-04-26 RX ORDER — METHYLPREDNISOLONE 4 MG/1
4 TABLET ORAL
Status: COMPLETED | OUTPATIENT
Start: 2024-04-27 | End: 2024-04-28

## 2024-04-26 RX ORDER — METHYLPREDNISOLONE 4 MG/1
4 TABLET ORAL
Status: DISCONTINUED | OUTPATIENT
Start: 2024-04-27 | End: 2024-04-29 | Stop reason: HOSPADM

## 2024-04-26 RX ORDER — METHYLPREDNISOLONE 4 MG/1
4 TABLET ORAL
Status: COMPLETED | OUTPATIENT
Start: 2024-04-27 | End: 2024-04-29

## 2024-04-26 RX ORDER — ENOXAPARIN SODIUM 100 MG/ML
40 INJECTION SUBCUTANEOUS DAILY
Status: DISCONTINUED | OUTPATIENT
Start: 2024-04-26 | End: 2024-04-29 | Stop reason: HOSPADM

## 2024-04-26 RX ORDER — METHYLPREDNISOLONE 4 MG/1
8 TABLET ORAL NIGHTLY
Status: COMPLETED | OUTPATIENT
Start: 2024-04-27 | End: 2024-04-27

## 2024-04-26 RX ORDER — METHYLPREDNISOLONE 4 MG/1
24 TABLET ORAL ONCE
Status: COMPLETED | OUTPATIENT
Start: 2024-04-26 | End: 2024-04-26

## 2024-04-26 RX ORDER — METHYLPREDNISOLONE 4 MG/1
4 TABLET ORAL NIGHTLY
Status: DISCONTINUED | OUTPATIENT
Start: 2024-04-28 | End: 2024-04-29 | Stop reason: HOSPADM

## 2024-04-26 RX ADMIN — ENOXAPARIN SODIUM 40 MG: 100 INJECTION SUBCUTANEOUS at 17:49

## 2024-04-26 RX ADMIN — SODIUM CHLORIDE, PRESERVATIVE FREE 10 ML: 5 INJECTION INTRAVENOUS at 20:16

## 2024-04-26 RX ADMIN — METHYLPREDNISOLONE 24 MG: 4 TABLET ORAL at 19:05

## 2024-04-26 RX ADMIN — FOLIC ACID 1 MG: 1 TABLET ORAL at 09:48

## 2024-04-26 RX ADMIN — SODIUM CHLORIDE, PRESERVATIVE FREE 10 ML: 5 INJECTION INTRAVENOUS at 09:48

## 2024-04-26 ASSESSMENT — PAIN DESCRIPTION - LOCATION
LOCATION: KNEE
LOCATION: KNEE

## 2024-04-26 ASSESSMENT — PAIN SCALES - GENERAL
PAINLEVEL_OUTOF10: 2
PAINLEVEL_OUTOF10: 3
PAINLEVEL_OUTOF10: 0
PAINLEVEL_OUTOF10: 4
PAINLEVEL_OUTOF10: 3
PAINLEVEL_OUTOF10: 0

## 2024-04-26 ASSESSMENT — PAIN DESCRIPTION - DESCRIPTORS
DESCRIPTORS: NUMBNESS;HEAVINESS
DESCRIPTORS: NUMBNESS

## 2024-04-26 ASSESSMENT — PAIN DESCRIPTION - FREQUENCY
FREQUENCY: CONTINUOUS
FREQUENCY: INTERMITTENT

## 2024-04-26 ASSESSMENT — PAIN DESCRIPTION - ONSET
ONSET: ON-GOING
ONSET: ON-GOING

## 2024-04-26 ASSESSMENT — PAIN DESCRIPTION - PAIN TYPE
TYPE: ACUTE PAIN
TYPE: ACUTE PAIN

## 2024-04-26 ASSESSMENT — PAIN - FUNCTIONAL ASSESSMENT
PAIN_FUNCTIONAL_ASSESSMENT: PREVENTS OR INTERFERES WITH ALL ACTIVE AND SOME PASSIVE ACTIVITIES
PAIN_FUNCTIONAL_ASSESSMENT: PREVENTS OR INTERFERES WITH ALL ACTIVE AND SOME PASSIVE ACTIVITIES

## 2024-04-26 ASSESSMENT — PAIN DESCRIPTION - ORIENTATION
ORIENTATION: RIGHT
ORIENTATION: RIGHT

## 2024-04-26 NOTE — ED NOTES
100% 100% 100%     FiO2 (%):   O2 Flow Rate: O2 Device: None (Room air)    Cardiac Rhythm:    Pain Assessment:  [] Verbal [] Ortiz Baker Scale  Pain Scale: Pain Assessment  Pain Assessment: 0-10  Pain Level: 5  Pain Location: Knee  Pain Orientation: Right  Pain Descriptors: Numbness  Last documented pain score (0-10 scale) Pain Level: 5  Last documented pain medication administered:   Mental Status: oriented, alert, and coherent  Orientation Level:    NIH Score:    C-SSRS:    Bedside swallow:    Ramu Coma Scale (GCS): Ramu Coma Scale  Eye Opening: Spontaneous  Best Verbal Response: Oriented  Best Motor Response: Obeys commands  [place] Coma Scale Score: 15  Active LDA's:    PO Status: Regular  Pertinent or High Risk Medications/Drips:    If Yes, please provide details:   Pending Blood Product Administration: no       You may also review the ED PT Care Timeline found under the Summary Nursing Index tab.    Recommendation    Pending orders: see signed and held  Plan for Discharge (if known):   Additional Comments: pt coming from home for numbness in R leg after procedure and nerve block administration on Monday. Was told to come in by pcp. A&Ox4. Able to pivot to bed/wheelchair. On RA.    If any further questions, please call Sending RN at [phone]    Electronically signed by: Electronically signed by Brandi Caal RN on 4/25/2024 at 10:40 PM       Brandi Caal RN  04/25/24 8839

## 2024-04-26 NOTE — CARE COORDINATION
Case Management Assessment  Initial Evaluation    Date/Time of Evaluation: 4/26/2024 11:01 AM  Assessment Completed by: Zeina Archer    If patient is discharged prior to next notation, then this note serves as note for discharge by case management.    Patient Name: Kris Elizalde                   YOB: 1980  Diagnosis: Right leg numbness [R20.0]  Left leg numbness [R20.0]  Radiculopathy, unspecified spinal region [M54.10]                   Date / Time: 4/25/2024  3:52 PM    Patient Admission Status: Inpatient   Readmission Risk (Low < 19, Mod (19-27), High > 27): Readmission Risk Score: 11    Current PCP: No primary care provider on file.  PCP verified by CM? No    Chart Reviewed: Yes      History Provided by: Patient  Patient Orientation: Alert and Oriented    Patient Cognition: Alert    Hospitalization in the last 30 days (Readmission):  No    If yes, Readmission Assessment in CM Navigator will be completed.    Advance Directives:      Code Status: Full Code   Patient's Primary Decision Maker is: Named in Scanned ACP Document      Discharge Planning:    Patient lives with: Spouse/Significant Other, Family Members Type of Home: House  Primary Care Giver: Self  Patient Support Systems include: Spouse/Significant Other, Family Members   Current Financial resources: Other (Comment)  Current community resources: None  Current services prior to admission: None            Current DME:              Type of Home Care services:  PT    ADLS  Prior functional level: Independent in ADLs/IADLs  Current functional level: Assistance with the following:, Toileting, Mobility    PT AM-PAC:   /24  OT AM-PAC:   /24    Family can provide assistance at DC: Yes  Would you like Case Management to discuss the discharge plan with any other family members/significant others, and if so, who? No  Plans to Return to Present Housing: Unknown at present  Other Identified Issues/Barriers to RETURNING to current housing:

## 2024-04-26 NOTE — H&P
MD   golimumab (SIMPONI ARIA) 50 MG/4ML SOLN  8/9/23   Provider, MD Yunior       Labs: Personally reviewed and interpreted for clinical significance.   Recent Labs     04/25/24  1658   WBC 7.8   HGB 12.8   HCT 39.0        Recent Labs     04/25/24  1658      K 4.2      CO2 26   BUN 7   CREATININE 0.7   CALCIUM 9.4   MG 1.80   PHOS 3.0        Naya Dao MD

## 2024-04-27 ENCOUNTER — APPOINTMENT (OUTPATIENT)
Dept: MRI IMAGING | Age: 44
DRG: 093 | End: 2024-04-27
Payer: COMMERCIAL

## 2024-04-27 LAB
ANION GAP SERPL CALCULATED.3IONS-SCNC: 10 MMOL/L (ref 3–16)
BUN SERPL-MCNC: 8 MG/DL (ref 7–20)
CALCIUM SERPL-MCNC: 9.1 MG/DL (ref 8.3–10.6)
CHLORIDE SERPL-SCNC: 103 MMOL/L (ref 99–110)
CO2 SERPL-SCNC: 22 MMOL/L (ref 21–32)
CREAT SERPL-MCNC: 0.6 MG/DL (ref 0.6–1.1)
DEPRECATED RDW RBC AUTO: 16.1 % (ref 12.4–15.4)
GFR SERPLBLD CREATININE-BSD FMLA CKD-EPI: >90 ML/MIN/{1.73_M2}
GLUCOSE SERPL-MCNC: 91 MG/DL (ref 70–99)
HCT VFR BLD AUTO: 41.8 % (ref 36–48)
HGB BLD-MCNC: 13.4 G/DL (ref 12–16)
MCH RBC QN AUTO: 29.1 PG (ref 26–34)
MCHC RBC AUTO-ENTMCNC: 32 G/DL (ref 31–36)
MCV RBC AUTO: 90.8 FL (ref 80–100)
PLATELET # BLD AUTO: 295 K/UL (ref 135–450)
PMV BLD AUTO: 8.5 FL (ref 5–10.5)
POTASSIUM SERPL-SCNC: 4.7 MMOL/L (ref 3.5–5.1)
RBC # BLD AUTO: 4.6 M/UL (ref 4–5.2)
SODIUM SERPL-SCNC: 135 MMOL/L (ref 136–145)
WBC # BLD AUTO: 10 K/UL (ref 4–11)

## 2024-04-27 PROCEDURE — 85027 COMPLETE CBC AUTOMATED: CPT

## 2024-04-27 PROCEDURE — 6370000000 HC RX 637 (ALT 250 FOR IP): Performed by: STUDENT IN AN ORGANIZED HEALTH CARE EDUCATION/TRAINING PROGRAM

## 2024-04-27 PROCEDURE — A9576 INJ PROHANCE MULTIPACK: HCPCS | Performed by: PSYCHIATRY & NEUROLOGY

## 2024-04-27 PROCEDURE — 6370000000 HC RX 637 (ALT 250 FOR IP): Performed by: INTERNAL MEDICINE

## 2024-04-27 PROCEDURE — 73720 MRI LWR EXTREMITY W/O&W/DYE: CPT

## 2024-04-27 PROCEDURE — 6360000002 HC RX W HCPCS: Performed by: STUDENT IN AN ORGANIZED HEALTH CARE EDUCATION/TRAINING PROGRAM

## 2024-04-27 PROCEDURE — 6360000004 HC RX CONTRAST MEDICATION: Performed by: PSYCHIATRY & NEUROLOGY

## 2024-04-27 PROCEDURE — 80048 BASIC METABOLIC PNL TOTAL CA: CPT

## 2024-04-27 PROCEDURE — 6360000002 HC RX W HCPCS: Performed by: INTERNAL MEDICINE

## 2024-04-27 PROCEDURE — 36415 COLL VENOUS BLD VENIPUNCTURE: CPT

## 2024-04-27 PROCEDURE — 1200000000 HC SEMI PRIVATE

## 2024-04-27 RX ORDER — FAMOTIDINE 20 MG/1
20 TABLET, FILM COATED ORAL 2 TIMES DAILY
Status: DISCONTINUED | OUTPATIENT
Start: 2024-04-27 | End: 2024-04-29 | Stop reason: HOSPADM

## 2024-04-27 RX ADMIN — FOLIC ACID 1 MG: 1 TABLET ORAL at 09:46

## 2024-04-27 RX ADMIN — FAMOTIDINE 20 MG: 20 TABLET, FILM COATED ORAL at 23:16

## 2024-04-27 RX ADMIN — GADOTERIDOL 20 ML: 279.3 INJECTION, SOLUTION INTRAVENOUS at 08:42

## 2024-04-27 RX ADMIN — METHYLPREDNISOLONE 4 MG: 4 TABLET ORAL at 18:12

## 2024-04-27 RX ADMIN — ONDANSETRON 4 MG: 2 INJECTION INTRAMUSCULAR; INTRAVENOUS at 05:44

## 2024-04-27 RX ADMIN — METHYLPREDNISOLONE 8 MG: 4 TABLET ORAL at 23:17

## 2024-04-27 RX ADMIN — METHYLPREDNISOLONE 4 MG: 4 TABLET ORAL at 15:11

## 2024-04-27 RX ADMIN — ENOXAPARIN SODIUM 40 MG: 100 INJECTION SUBCUTANEOUS at 09:43

## 2024-04-27 RX ADMIN — METHYLPREDNISOLONE 4 MG: 4 TABLET ORAL at 06:44

## 2024-04-27 ASSESSMENT — PAIN SCALES - GENERAL
PAINLEVEL_OUTOF10: 0
PAINLEVEL_OUTOF10: 0

## 2024-04-27 NOTE — PLAN OF CARE
Problem: Discharge Planning  Goal: Discharge to home or other facility with appropriate resources  Outcome: Progressing     Problem: Pain  Goal: Verbalizes/displays adequate comfort level or baseline comfort level  4/27/2024 0725 by Smita Herr RN  Outcome: Progressing  Verbalizes/displays adequate comfort level or baseline comfort level: Encourage patient to monitor pain and request assistance   Pain assessed q4 hrs and PRN using the 0-10 pain scale. Pain goal reviewed with patient. PRN pain medications given as needed and comfort provided non-pharmacologically (i.e. Repositioning). Patient instructed to report pain to RN.     Problem: Safety - Adult  Goal: Free from fall injury  4/27/2024 0725 by Smita Herr, RN  Outcome: Progressing

## 2024-04-27 NOTE — CONSULTS
Neurology Consult - 04/26/24    Impression:  RLE weakness/numbness. Sx and exam do not appear to localize to any specific dermatome, myotome, or peripheral nerve distribution. Cannot entirely exclude focal hematoma or compartment syndrome though not likely given lack of pain. Highly doubt plexopathy. No evidence to suggest radiculopathy or CNS etiology. Given RA dx, also considered mononeuritis multiplex though do not suspect given normal ESR and minimally elevated CRP.   Right knee arthrofibrosis.  RA.    Plan:   MRI right femur w/wo.  Continue rehab efforts.  If sx persist, then outpatient EMG could be of benefit though due to delay in denervation potentials, would recommend delaying 4-6 weeks for adequate EMG findings to develop.  Above discussed with patient (in the presence of patient's  on speakerphone).   I advised her that I suspect sx will improve in short term.  Above discussed with Dr. Shipley.  See dictated note. #[id number]        --  Thank you for allowing me to participate in the care of your patient.  If I can be of any further assistance, please do not hesitate to contact me.    Freddie Wolfe, DO  [facility]  
Kris Elizalde came into the hospital [facility] for right leg numbness. She reports that she had a knee manipulation on Monday and on chart review had an adductor canal nerve block. Kris Elizalde reports that during the day on Monday, she had adequate pain relief, but on Monday night and Tuesday she needed to take \"pain pills\" for knee pain.    Subsequently on Wednesday, Kris Elizalde reports that she had numbness on the medial aspect of her tibia.    She reports that she had numbness of the lateral and posterior aspect of her leg today along with heaviness.    Vitals:    04/25/24 1932   BP: 131/80   Pulse:    Resp:    Temp:    SpO2: 100%       Physical exam:    Kris Elizalde reports that she has some loss of sensation over the saphenous nerve distribution in the right lower extremity with pin prick compared to her left side. She also reports lack of sensation to pin prick in the proximal femoral nerve distribution as well as the sciatic distribution (lateral and anterior lower leg and lateral thigh and posterior upper leg) of her right leg. She reported the ability to feel sensation with pin prick in all distributions equally in her left leg.    Assessment/Plan:    Given Kris Elizalde's description of analgesia followed by regaining sensation requiring pain medications and recurrence of numbness and weakness in a distribution that is not consistent with an adductor canal nerve block.    I was present during the entirety of the adductor canal nerve block, and although not the needle , was present and saw the ultrasound images during the entirety of the procedure. There was no evidence of venous or arterial puncture with the nerve block needle, there was no blood on aspiration prior to injection of local anesthetic, and no signs or symptoms of intravascular injection at the time of the nerve block. With these things in mind, I feel there is a very low likelihood of femoral artery aneurysm causing nerve compressive 
Wednesday is when she first noticed that her anterior leg started to become numb, similar to how a nerve block felt. She also states her therapist noted some weakness in her right leg as well as some swelling of her right knee/thigh post-procedure. She states that Thursday, the numbness continued to spread down onto the top of her right foot and slightly up into her thigh. She also reported a heavy sensation in her leg as well, making it very difficult to move. She has never had this kind of problem before following a procedure/nerve block. She saw the orthopedic PA yesterday for this numbness/weakness and was advised to present to the ER.     On arrival to the ED, her right leg is still numb/weak. She was evaluated by the anesthesiologist who was present for her nerve block and was noted to have decreased sensation, including loss of pinprick sensation in the affected areas. She was also unable to lift her knee or leg off the bed. She was able to wiggle her toes and had some strength in both dorsiflexion and plantarflexion of her right foot. Her vitals were all normal, afebrile, not hypo/hypertensive or hypoxic. She did not have any red flag signs such as saddle anesthesia, bowel/bladder incontinence, point tenderness over her spine, fever/headache, neck stiffness, any other focal neurological deficits, no LOC. Basic work-up revealed unremarkable CBC and BMP. MRI lumbar spine was done which was read as normal. She was then admitted for further evaluation on her right leg numbness.    This morning, patient notes her leg is still numb/weak. She states last night she felt like the numbness continued to spread down further onto her right foot. She is able to ambulate with a walker and assistance however she states she is not able to lift her right foot when she walks, she drags it around. She was unable to lift her leg or knee off the bed. Sensation is still decreased over the anterior shin, dorsum of right foot, 
was only 14.  White blood cell count 7.8.    An MRI of the lumbar spine was performed yesterday and I have personally reviewed these images myself.  There were no significant abnormalities identified on my review.  No obvious disk herniations, nerve root impingement, or any other acute findings were identified.  There was no canal stenosis identified.    IMPRESSION:    1. A 43-year-old female with reported right lower extremity weakness and numbness.  Her symptoms and exam do not appear to localize to any specific dermatomal, myotomal, or peripheral nerve distribution.  At this time, I cannot entirely exclude a focal hematoma or compartment syndrome given her recent procedure, though this appears unlikely given her lack of pain.  At this time, I highly doubt a plexopathy.  Mononeuritis multiplex was considered, especially given her history of rheumatoid arthritis; however, given her normal sedimentation rate and only minimally elevated CRP, this also appears highly unlikely.  I also do not see any evidence to suggest a radiculopathy or a central nervous system etiology.  2. Right knee arthrofibrosis with multiple prior surgery/procedures.  3. Rheumatoid arthritis.    PLAN:    1. I have ordered an MRI of the right femur with and without contrast enhancement to evaluate for any evidence of compartment syndrome or other focal findings such as a hematoma or abnormal signal changes of the peripheral nerves.  2. Continue rehab efforts.  3. If her symptoms continue to persist, then I would recommend an EMG as an outpatient, though I would recommend delaying a minimum of 4 weeks for adequate EMG findings to develop.  The above was discussed with the patient in the presence of her  on the speaker phone.  4. I also advised her that I suspect that her symptoms will improve over time in the short term.  5. The above was also discussed with Dr. Shipley.  6. A total of 80 minutes was spent with this patient today, including

## 2024-04-28 LAB
ANION GAP SERPL CALCULATED.3IONS-SCNC: 9 MMOL/L (ref 3–16)
BUN SERPL-MCNC: 10 MG/DL (ref 7–20)
CALCIUM SERPL-MCNC: 9 MG/DL (ref 8.3–10.6)
CHLORIDE SERPL-SCNC: 102 MMOL/L (ref 99–110)
CO2 SERPL-SCNC: 25 MMOL/L (ref 21–32)
CREAT SERPL-MCNC: 0.7 MG/DL (ref 0.6–1.1)
DEPRECATED RDW RBC AUTO: 16.1 % (ref 12.4–15.4)
GFR SERPLBLD CREATININE-BSD FMLA CKD-EPI: >90 ML/MIN/{1.73_M2}
GLUCOSE SERPL-MCNC: 89 MG/DL (ref 70–99)
HCT VFR BLD AUTO: 39.7 % (ref 36–48)
HGB BLD-MCNC: 12.6 G/DL (ref 12–16)
MAGNESIUM SERPL-MCNC: 1.8 MG/DL (ref 1.8–2.4)
MCH RBC QN AUTO: 28.7 PG (ref 26–34)
MCHC RBC AUTO-ENTMCNC: 31.8 G/DL (ref 31–36)
MCV RBC AUTO: 90.2 FL (ref 80–100)
PLATELET # BLD AUTO: 325 K/UL (ref 135–450)
PMV BLD AUTO: 8.3 FL (ref 5–10.5)
POTASSIUM SERPL-SCNC: 3.4 MMOL/L (ref 3.5–5.1)
RBC # BLD AUTO: 4.4 M/UL (ref 4–5.2)
SODIUM SERPL-SCNC: 136 MMOL/L (ref 136–145)
WBC # BLD AUTO: 11.1 K/UL (ref 4–11)

## 2024-04-28 PROCEDURE — 2580000003 HC RX 258: Performed by: INTERNAL MEDICINE

## 2024-04-28 PROCEDURE — 1200000000 HC SEMI PRIVATE

## 2024-04-28 PROCEDURE — 36415 COLL VENOUS BLD VENIPUNCTURE: CPT

## 2024-04-28 PROCEDURE — 85027 COMPLETE CBC AUTOMATED: CPT

## 2024-04-28 PROCEDURE — 6360000002 HC RX W HCPCS: Performed by: STUDENT IN AN ORGANIZED HEALTH CARE EDUCATION/TRAINING PROGRAM

## 2024-04-28 PROCEDURE — 6370000000 HC RX 637 (ALT 250 FOR IP): Performed by: STUDENT IN AN ORGANIZED HEALTH CARE EDUCATION/TRAINING PROGRAM

## 2024-04-28 PROCEDURE — 80048 BASIC METABOLIC PNL TOTAL CA: CPT

## 2024-04-28 PROCEDURE — 83735 ASSAY OF MAGNESIUM: CPT

## 2024-04-28 PROCEDURE — 6370000000 HC RX 637 (ALT 250 FOR IP): Performed by: INTERNAL MEDICINE

## 2024-04-28 RX ADMIN — METHYLPREDNISOLONE 4 MG: 4 TABLET ORAL at 06:47

## 2024-04-28 RX ADMIN — SODIUM CHLORIDE, PRESERVATIVE FREE 10 ML: 5 INJECTION INTRAVENOUS at 08:21

## 2024-04-28 RX ADMIN — FOLIC ACID 1 MG: 1 TABLET ORAL at 08:21

## 2024-04-28 RX ADMIN — SODIUM CHLORIDE, PRESERVATIVE FREE 10 ML: 5 INJECTION INTRAVENOUS at 19:25

## 2024-04-28 RX ADMIN — METHYLPREDNISOLONE 4 MG: 4 TABLET ORAL at 13:00

## 2024-04-28 RX ADMIN — METHYLPREDNISOLONE 4 MG: 4 TABLET ORAL at 19:25

## 2024-04-28 RX ADMIN — SODIUM CHLORIDE, PRESERVATIVE FREE 10 ML: 5 INJECTION INTRAVENOUS at 08:22

## 2024-04-28 RX ADMIN — FAMOTIDINE 20 MG: 20 TABLET, FILM COATED ORAL at 19:25

## 2024-04-28 RX ADMIN — ENOXAPARIN SODIUM 40 MG: 100 INJECTION SUBCUTANEOUS at 08:21

## 2024-04-28 RX ADMIN — METHYLPREDNISOLONE 4 MG: 4 TABLET ORAL at 17:45

## 2024-04-28 RX ADMIN — FAMOTIDINE 20 MG: 20 TABLET, FILM COATED ORAL at 08:21

## 2024-04-28 NOTE — PLAN OF CARE
Neurology Plan of Care     Patient is a 43 y.o. y/o female, neurology consulted for RLE weakness / numbness  Lumbar MRI normal   R femur MRI - no evidence of compartment syndrome, no abnormal enhancement    No further inpatient w/u from neurology standpoint  F/u w/ Dr. Wolfe in 4 weeks for EMG if still having any weakness or sensatory changes.   We will sign off. Please call with questions.     Ca Drake, AICHA - CNP   NEUROLOGY  4/28/2024 4:35 AM   [facility]: [facility] NEUROLOGY

## 2024-04-29 ENCOUNTER — APPOINTMENT (OUTPATIENT)
Dept: PHYSICAL THERAPY | Age: 44
End: 2024-04-29
Payer: COMMERCIAL

## 2024-04-29 ENCOUNTER — HOSPITAL ENCOUNTER (OUTPATIENT)
Dept: PHYSICAL THERAPY | Age: 44
Setting detail: THERAPIES SERIES
Discharge: HOME | End: 2024-04-29
Payer: COMMERCIAL

## 2024-04-29 ENCOUNTER — HOSPITAL ENCOUNTER (OUTPATIENT)
Dept: PHYSICAL THERAPY | Age: 44
Setting detail: THERAPIES SERIES
End: 2024-04-29
Payer: COMMERCIAL

## 2024-04-29 VITALS
HEART RATE: 91 BPM | BODY MASS INDEX: 32.8 KG/M2 | SYSTOLIC BLOOD PRESSURE: 120 MMHG | DIASTOLIC BLOOD PRESSURE: 71 MMHG | WEIGHT: 209 LBS | TEMPERATURE: 98 F | RESPIRATION RATE: 16 BRPM | HEIGHT: 67 IN | OXYGEN SATURATION: 95 %

## 2024-04-29 LAB
ANION GAP SERPL CALCULATED.3IONS-SCNC: 10 MMOL/L (ref 3–16)
BUN SERPL-MCNC: 9 MG/DL (ref 7–20)
CALCIUM SERPL-MCNC: 8.8 MG/DL (ref 8.3–10.6)
CHLORIDE SERPL-SCNC: 104 MMOL/L (ref 99–110)
CO2 SERPL-SCNC: 25 MMOL/L (ref 21–32)
CREAT SERPL-MCNC: 0.6 MG/DL (ref 0.6–1.1)
DEPRECATED RDW RBC AUTO: 16 % (ref 12.4–15.4)
GFR SERPLBLD CREATININE-BSD FMLA CKD-EPI: >90 ML/MIN/{1.73_M2}
GLUCOSE SERPL-MCNC: 107 MG/DL (ref 70–99)
HCT VFR BLD AUTO: 36.8 % (ref 36–48)
HGB BLD-MCNC: 12.2 G/DL (ref 12–16)
MCH RBC QN AUTO: 30.2 PG (ref 26–34)
MCHC RBC AUTO-ENTMCNC: 33.1 G/DL (ref 31–36)
MCV RBC AUTO: 91.1 FL (ref 80–100)
PLATELET # BLD AUTO: 300 K/UL (ref 135–450)
PMV BLD AUTO: 8 FL (ref 5–10.5)
POTASSIUM SERPL-SCNC: 3.6 MMOL/L (ref 3.5–5.1)
RBC # BLD AUTO: 4.04 M/UL (ref 4–5.2)
SODIUM SERPL-SCNC: 139 MMOL/L (ref 136–145)
WBC # BLD AUTO: 10.4 K/UL (ref 4–11)

## 2024-04-29 PROCEDURE — 80048 BASIC METABOLIC PNL TOTAL CA: CPT

## 2024-04-29 PROCEDURE — 97116 GAIT TRAINING THERAPY: CPT

## 2024-04-29 PROCEDURE — 6360000002 HC RX W HCPCS: Performed by: STUDENT IN AN ORGANIZED HEALTH CARE EDUCATION/TRAINING PROGRAM

## 2024-04-29 PROCEDURE — 6370000000 HC RX 637 (ALT 250 FOR IP): Performed by: INTERNAL MEDICINE

## 2024-04-29 PROCEDURE — 85027 COMPLETE CBC AUTOMATED: CPT

## 2024-04-29 PROCEDURE — 6370000000 HC RX 637 (ALT 250 FOR IP): Performed by: STUDENT IN AN ORGANIZED HEALTH CARE EDUCATION/TRAINING PROGRAM

## 2024-04-29 PROCEDURE — 97110 THERAPEUTIC EXERCISES: CPT | Performed by: PHYSICAL THERAPIST

## 2024-04-29 PROCEDURE — 36415 COLL VENOUS BLD VENIPUNCTURE: CPT

## 2024-04-29 PROCEDURE — 97530 THERAPEUTIC ACTIVITIES: CPT

## 2024-04-29 PROCEDURE — 2580000003 HC RX 258: Performed by: INTERNAL MEDICINE

## 2024-04-29 PROCEDURE — 97162 PT EVAL MOD COMPLEX 30 MIN: CPT

## 2024-04-29 RX ORDER — ASPIRIN 325 MG
325 TABLET, DELAYED RELEASE (ENTERIC COATED) ORAL DAILY
Qty: 30 TABLET | Refills: 0 | Status: SHIPPED | OUTPATIENT
Start: 2024-04-29 | End: 2024-05-29

## 2024-04-29 RX ORDER — METHYLPREDNISOLONE 4 MG/1
TABLET ORAL
Qty: 6 TABLET | Refills: 0 | Status: SHIPPED | OUTPATIENT
Start: 2024-04-29 | End: 2024-05-02

## 2024-04-29 RX ORDER — FAMOTIDINE 20 MG/1
20 TABLET, FILM COATED ORAL 2 TIMES DAILY
Qty: 14 TABLET | Refills: 0 | Status: SHIPPED | OUTPATIENT
Start: 2024-04-29 | End: 2024-05-06

## 2024-04-29 RX ADMIN — ENOXAPARIN SODIUM 40 MG: 100 INJECTION SUBCUTANEOUS at 09:08

## 2024-04-29 RX ADMIN — METHYLPREDNISOLONE 4 MG: 4 TABLET ORAL at 12:49

## 2024-04-29 RX ADMIN — FAMOTIDINE 20 MG: 20 TABLET, FILM COATED ORAL at 09:08

## 2024-04-29 RX ADMIN — METHYLPREDNISOLONE 4 MG: 4 TABLET ORAL at 06:33

## 2024-04-29 RX ADMIN — SODIUM CHLORIDE, PRESERVATIVE FREE 10 ML: 5 INJECTION INTRAVENOUS at 09:09

## 2024-04-29 RX ADMIN — FOLIC ACID 1 MG: 1 TABLET ORAL at 09:08

## 2024-04-29 NOTE — FLOWSHEET NOTE
[facility]- Outpatient Rehabilitation and Therapy [address], [place] [address] office: [phone] fax: [phone]      Physical Therapy: TREATMENT/PROGRESS NOTE   Patient: Kris Elizalde (43 y.o. female)   Treatment Date: 2024   :  1980 MRN: [id number]   Visit #: 37   Insurance Allowable Auth Needed   25 []Yes    [x]No    Insurance: Payor: BCBS / Plan: BCBS OUT OF STATE / Product Type: *No Product type* / $0 copay/25 vpcy/no auth  Insurance ID: [id number] - ([place] [employer])  Secondary Insurance (if applicable):    Treatment Diagnosis:   1. Right knee pain, unspecified chronicity  M25.561         2. Arthrofibrosis of knee joint, right  M24.661         3. Edema of knee  M25.469         4. Gait abnormality  R26.9         5. 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                             Plan of care signed (Y/N):     Date of Patient follow up with Physician:      Progress Report/POC: NO  POC update due: 2024 (OR 10 visits /OR AUTH LIMITS, whichever is less)    Patient seen in consultation with Dr. Shipley who established the initial/subsequent treatment protocol.   24: seen by Frank- d/c gabapentin secondary to nausea/dizziness.  Begin MDP.  Contact Dr Rivera for early consult.  24: ok to remove bandage and sutures, will monitor swelling  24: seen by Dr Shipley-begin 10mg prednisone. Pt seeing Dr Rivera on Thursday.  Continue focus on increased flexion ROM.  Consider SAURABH in 2 weeks if ROM is not progressing well.  24: seen by Frank-planning to schedule SAURABH for next week.  Pt to be placed on a lower dose of Celebrex.  24: seen by Dr Shipley-extensive adhesions were noted along the medial border of the patella.  Able to reach 90 degrees of flexion with

## 2024-04-29 NOTE — PROGRESS NOTES
Department of Orthopedic Surgery  Physician Assistant  Progress Note    Subjective:       Systemic or Specific Complaints: Lower extremity numbness and weakness    The patient notes that she has some mild quadricep activation.  Numbness continues to persist in the anterior aspect of the shin to the dorsum of the foot.  Pain is well-controlled.  MRI of the femur was fairly unremarkable.        Objective:     Patient Vitals for the past 24 hrs:   BP Temp Temp src Pulse Resp SpO2   04/27/24 1513 118/67 98 °F (36.7 °C) Oral 91 16 96 %   04/27/24 1029 117/73 97.8 °F (36.6 °C) Oral 90 16 98 %   04/27/24 0900 116/76 97.9 °F (36.6 °C) Axillary 100 18 100 %   04/27/24 0208 124/79 97.4 °F (36.3 °C) Oral 80 18 98 %   04/26/24 2026 127/79 98 °F (36.7 °C) Oral 88 18 97 %   04/26/24 1746 122/78 98 °F (36.7 °C) Oral 91 18 98 %       General: alert, appears stated age, and cooperative   Wound: Wound clean and dry no evidence of infection.   Motion: Painless Range of Motion in affected extremity.  Mild quadricep activation.  Ankle dorsiflexion and EHL intact.   N/V Numbness of the anterior aspect of the shin and dorsum of the foot in the right lower extremity.  Calf soft and nontender, 2+ dorsal pedal pulse   DVT Exam: No evidence of DVT seen on physical exam.       Data Review  CBC:   Lab Results   Component Value Date/Time    WBC 10.0 04/27/2024 09:57 AM    RBC 4.60 04/27/2024 09:57 AM    HGB 13.4 04/27/2024 09:57 AM    HCT 41.8 04/27/2024 09:57 AM     04/27/2024 09:57 AM           Assessment:     Hospital day 2: Lower extremity numbness and quadricep shutdown     Plan:     Continue pain control regimen  DVT prophylaxis: Sequential compression devices, ISAIAS hose, low-dose aspirin  PT/OT as tolerated  Up with assistance and walker  Knee immobilizer while the patient is standing or ambulating  Appreciate neurology recommendations  Appreciate internal medicine care and recommendations  Given that the patient still does not 
  Hospital Medicine Progress Note      Date of Admission: 4/25/2024  Hospital Day: 2    Chief Admission Complaint: Right leg numbness     Subjective: Patient was seen and evaluated at bedside.  Reported ongoing numbness of her right lower extremity as well as weakness and difficulty in ambulation.  Did not have any other complaints.  Did not have any events overnight.    Presenting Admission History:       43 y.o. female with PMHx significant for rheumatoid arthritis on methotrexate and Simponi, arthritis of the right knee status post replacement, vestibular neuritis on nortriptyline currently on hold.  Patient lives in [place] and was here as a follow-up for right knee surgery that she had earlier this year.  She had right femoral nerve block as a component of manipulation under anesthesia of her right knee with PRP injection that took place on 4/22/2024., Postoperatively patient developed progressive numbness as she was working with therapy involving the anterior aspect of her right leg.  But she was still able to ambulate although with some difficulty.  Numbness progressed to the point where she was unable to ambulate or bear weight on the right leg.  Patient is on methotrexate 250 mg/10 mL injection weekly and took a dose yesterday of 0.6 ml. When patient presented to the emergency room today workup was within normal limit and MRI of the lumbar spine showed no acute finding.     Assessment/Plan:      Current Principal Problem:  Right leg numbness    Right leg numbness  Rheumatoid arthritis  Vestibular neuritis  Obesity    Plan:  *Reports right leg numbness with difficulty in ambulation and bearing weight  4/25/2024 MRI of the lumbar spine negative for acute pathology  Check ESR/CRP  Medrol Dosepak was ordered as an outpatient but the patient did not start it.  Consult orthopedics and neurology    *On methotrexate and Simponi for rheumatoid arthritis at baseline  methotrexate 250 mg/10 mL injection weekly and 
  Hospital Medicine Progress Note      Date of Admission: 4/25/2024  Hospital Day: 4    Chief Admission Complaint: Right leg numbness     Subjective: Patient was seen and evaluated at bedside.  Still complains of some numbness of her right lower extremity along with weakness however sensation has started to improve.  Did not have any other complaints.  Did not have any events overnight.    Presenting Admission History:       43 y.o. female with PMHx significant for rheumatoid arthritis on methotrexate and Simponi, arthritis of the right knee status post replacement, vestibular neuritis on nortriptyline currently on hold.  Patient lives in [place] and was here as a follow-up for right knee surgery that she had earlier this year.  She had right femoral nerve block as a component of manipulation under anesthesia of her right knee with PRP injection that took place on 4/22/2024., Postoperatively patient developed progressive numbness as she was working with therapy involving the anterior aspect of her right leg.  But she was still able to ambulate although with some difficulty.  Numbness progressed to the point where she was unable to ambulate or bear weight on the right leg.  Patient is on methotrexate 250 mg/10 mL injection weekly and took a dose yesterday of 0.6 ml. When patient presented to the emergency room today workup was within normal limit and MRI of the lumbar spine showed no acute finding.     Assessment/Plan:      Current Principal Problem:  Right leg numbness    Right leg numbness  Rheumatoid arthritis  Vestibular neuritis  Obesity    Plan:  *Reports right leg numbness with difficulty in ambulation and bearing weight  4/25/2024 MRI of the lumbar spine negative for acute pathology  ESR 14, CRP 5.6  Medrol Dosepak per orthopedics  Pepcid for GI protection  Knee stabilizer for orthopedics  PT OT to see per orthopedics  MRI of the right femur per neurology showed Previous knee replacement with longstem 
4 Eyes Skin Assessment     NAME:  Kris Elizalde  YOB: 1980  MEDICAL RECORD NUMBER:  [id number]    The patient is being assessed for  Admission    I agree that at least one RN has performed a thorough Head to Toe Skin Assessment on the patient. ALL assessment sites listed below have been assessed.      Areas assessed by both nurses:    Head, Face, Ears, Shoulders, Back, Chest, Arms, Elbows, Hands, Sacrum. Buttock, Coccyx, Ischium, Legs. Feet and Heels, and Under Medical Devices         Does the Patient have a Wound? No noted wound(s)       Mazin Prevention initiated by RN: No  Wound Care Orders initiated by RN: No    Pressure Injury (Stage 3,4, Unstageable, DTI, NWPT, and Complex wounds) if present, place Wound referral order by RN under : No    New Ostomies, if present place, Ostomy referral order under : No     Nurse 1 eSignature: Electronically signed by Bren Alcantara RN on 4/26/24 at 12:10 AM EDT    **SHARE this note so that the co-signing nurse can place an eSignature**    Nurse 2 eSignature: Electronically signed by Lizbeth Henry RN on 4/26/24 at 12:31 AM EDT    
Occupational Therapy  Order received and chart reviewed. OT spoke with pt who does not feel she requires OT evaluation. Pt plans to return to OP PT services at [place]. Likely Monday.     Yasmin Garza OTR/L     
Patient admitted to 3312 from ED.  A&Ox4.  RA, sat 97%.  Lungs clear.  Skin intact.  Up with assist/steady to bathroom.  C/o pain in RLE/knee.  Refuses pain meds at this time.  C/o numbness in RLE.  Right AC PIV intact and flushed.  No needs at this time.  Patient resting comfortably in bed.  Call light in reach.  Bed alarm on.  Will continue to monitor.   Electronically signed by Bren Alcantara RN on 4/26/2024 at 12:12 AM    
Patient is A&O x4.  RA, sat 98%.  No complaints of pain or SOB.  C/o right leg numbness.  Can feel bottom of foot.  Vital signs stable as charted.  Respirations appear to easy and unlabored.  Lungs clear.  Respirations easy with no complaints of cough.  No complaints of nausea/vomiting/diarrhea.  Up with assist/walker/immobilizer to the bathroom or chair as needed.  Right AC PIV intact and flushed.    Tolerating regular diet.  Plan of care and safety measures reviewed with the patient.  Call light in reach and bed alarm in place.  Bed attached to wall for alarm purposes.  Will continue to monitor.  Electronically signed by Bren Alcantara RN on 4/26/2024 at 10:52 PM    
Patient medicated with zofran IVP for c/o nausea.  Electronically signed by Bren Alcantara RN on 4/27/2024 at 6:39 AM    
Pt A&Ox4 vss. Denies pain. RLE numb up to thigh. Awaiting PT/OT pt ambulating in halls with walker and knee immobilizer, walking 15 feet tolerating fairly well. Pt in chair with wheels locked and chair alarm set. Electronically signed by Mone Vaughn RN on 4/28/2024 at 1:38 PM   
Pt alert and oriented. VSS. Voiding. Tolerating diet. Denies pain with exception to chronic knee pain.   
--   04/26/24 0522 123/75 97.3 °F (36.3 °C) Oral 78 18 97 % -- --   04/25/24 2332 (!) 142/85 97.5 °F (36.4 °C) Oral 81 18 100 % 1.702 m (5' 7\") 94.8 kg (208 lb 15.9 oz)   04/25/24 2059 127/84 -- -- -- -- 100 % -- --   04/25/24 2029 132/83 -- -- -- -- 100 % -- --   04/25/24 1959 127/81 -- -- -- -- 100 % -- --   04/25/24 1932 131/80 -- -- -- -- 100 % -- --   04/25/24 1904 135/77 -- -- -- -- 100 % -- --   04/25/24 1704 111/75 -- -- 80 14 100 % -- --   04/25/24 1634 (!) 141/99 -- -- 85 16 100 % -- --   04/25/24 1604 137/78 -- -- 79 20 100 % -- --   04/25/24 1600 121/73 97.6 °F (36.4 °C) Oral 87 25 100 % -- --       General: alert, appears stated age, and cooperative   Wound: Wound clean and dry no evidence of infection.   Motion: No ability to conduct knee extension.  Plantarflexion, dorsiflexion great toe extension intact   N/V Calf soft and nontender, 2+ dorsal pedal pulse   DVT Exam: No evidence of DVT seen on physical exam.       Data Review  CBC:   Lab Results   Component Value Date/Time    WBC 7.8 04/25/2024 04:58 PM    RBC 4.29 04/25/2024 04:58 PM    HGB 12.8 04/25/2024 04:58 PM    HCT 39.0 04/25/2024 04:58 PM     04/25/2024 04:58 PM           Assessment:     Hospital day 1: Lower extremity numbness and quadricep shutdown     Plan:     Continue pain control regimen  DVT prophylaxis: Sequential compression devices, ISAIAS hose, low-dose aspirin  PT/OT as tolerated  Consult neurology  Appreciate internal medicine care and recommendations            Frank Connell PA-C    Physician Assistant - Certified  Orthopedic Surgery   [facility]    04/26/24 11:33 AM        This dictation was performed with a verbal recognition program (DRAGON) and it was checked for errors.  It is possible that there are still dictated errors within this office note.  If so, please bring any errors to my attention for an addendum.  All efforts were made to ensure that this office note is accurate.    
[]Hospice  [x]Other -pending clinical course    Consults:      IP CONSULT TO ORTHOPEDIC SURGERY  IP CONSULT TO ANESTHESIOLOGY  IP CONSULT TO NEUROLOGY        Medications:  Personally reviewed in detail in conjunction w/ labs as documented for evidence of drug toxicity.     Infusion Medications    sodium chloride       Scheduled Medications    enoxaparin  40 mg SubCUTAneous Daily    methylPREDNISolone  4 mg Oral QAM AC    methylPREDNISolone  4 mg Oral Lunch    methylPREDNISolone  4 mg Oral Dinner    methylPREDNISolone  8 mg Oral Nightly    [START ON 4/28/2024] methylPREDNISolone  4 mg Oral Nightly    folic acid  1 mg Oral Daily    sodium chloride flush  5-40 mL IntraVENous 2 times per day     PRN Meds: sodium chloride flush, sodium chloride, ondansetron **OR** ondansetron, polyethylene glycol, acetaminophen **OR** acetaminophen     Labs:  Personally reviewed and interpreted for clinical significance.     Recent Labs     04/25/24 1658 04/27/24  0957   WBC 7.8 10.0   HGB 12.8 13.4   HCT 39.0 41.8    295     Recent Labs     04/25/24 1658 04/27/24  0957    135*   K 4.2 4.7    103   CO2 26 22   BUN 7 8   CREATININE 0.7 0.6   CALCIUM 9.4 9.1   MG 1.80  --    PHOS 3.0  --      No results for input(s): \"PROBNP\", \"TROPHS\" in the last 72 hours.  No results for input(s): \"LABA1C\" in the last 72 hours.  No results for input(s): \"AST\", \"ALT\", \"BILIDIR\", \"BILITOT\", \"ALKPHOS\" in the last 72 hours.  No results for input(s): \"INR\", \"LACTA\", \"TSH\" in the last 72 hours.    Urine Cultures: No results found for: \"LABURIN\"  Blood Cultures: No results found for: \"BC\"  No results found for: \"BLOODCULT2\"  Organism: No results found for: \"ORG\"      Marly Hanson MD   
chair?: A Little  How much help is needed standing up from a chair using your arms?: A Little  How much help is needed walking in hospital room?: A Little  How much help is needed climbing 3-5 steps with a railing?: A Lot  AM-PAC Inpatient Mobility Raw Score : 19  AM-PAC Inpatient T-Scale Score : 45.44  Mobility Inpatient CMS 0-100% Score: 41.77  Mobility Inpatient CMS G-Code Modifier : CK    Goals  Short Term Goals  Time Frame for Short Term Goals: d/c  Short Term Goal 1: sup<>sit supervision  Short Term Goal 2: sit<>stand supervision  Short Term Goal 3: amb 50' with RW and supervision  Patient Goals   Patient Goals : return to [place] when able       Education  Patient Education  Education Given To: Patient  Education Provided: Role of Therapy;Transfer Training  Education Provided Comments: RLE knee brace education - locking/unlocking, proper alignment, donning/doffing  Education Method: Demonstration  Barriers to Learning: None  Education Outcome: Verbalized understanding      Therapy Time   Individual Concurrent Group Co-treatment   Time In 0800         Time Out 0840         Minutes 40         Timed Code Treatment Minutes:  25    Total Treatment Minutes:  40     If the patient is discharged before the next treatment session, this note will serve as the discharge summary.     Pedro Hess, PT, DPT

## 2024-04-29 NOTE — PLAN OF CARE
Problem: Pain  Goal: Verbalizes/displays adequate comfort level or baseline comfort level  Flowsheets (Taken 4/27/2024 0208 by Bren Alcantara RN)  Verbalizes/displays adequate comfort level or baseline comfort level: Encourage patient to monitor pain and request assistance

## 2024-04-29 NOTE — DISCHARGE SUMMARY
and rhythm, no murmurs rubs or gallops  Respiratory: No wheeze or crackles appreciated, equal air entry bilaterally  GI/: Abdomen soft, nontender, obese, bowel sounds audible  Skin: No rashes or ulcers present  MSK: Right leg in compression stocking, sensation in the right leg has improved slightly however has not returned to baseline, power in the right leg is 2/5, left leg 5/5        Labs and Imaging   MRI FEMUR RIGHT W WO CONTRAST    Result Date: 4/27/2024  History: Right leg weakness. MRI of the right femur without contrast. TECHNIQUE: Multiplanar T1 and T2-weighted images were obtained of the right femur with and without contrast. Postcontrast images were obtained after the patient was given 15 mL ProHance. FINDINGS: Imaging of the right hip demonstrates no suspicious abnormality. Joint space is maintained. Marrow signal is intact. No significant muscular edema identified. No mass or significant lymphadenopathy. Along the lower femur there is evidence of previous knee replacement with long stem femoral component. Resulting artifact limits overall evaluation. There appears to be joint fluid in the superior joint recess and mild edema along the inferior aspect of the quadriceps musculature securely in the vastus lateralis. This is of unclear significance. The quadriceps tendon to the extent visualized appears to be intact. No suspicious fluid collection clearly identified. No significant marrow edema identified in the lower femur.     1. Previous knee replacement with longstem femoral component. Resulting artifact limits overall evaluation. 2. Small to moderate fluid in the superior joint recess. 3. Mild edema along the lower quadriceps musculature particularly in the vastus lateralis of unclear significance. This may reflect recent surgery. No suspicious fluid collection identified otherwise. Electronically signed by Jonathan Blount MD    MRI LUMBAR SPINE W WO CONTRAST    Result Date: 4/25/2024  PROCEDURE:MRI

## 2024-04-29 NOTE — CARE COORDINATION
Case Management Assessment            Discharge Note                    Date / Time of Note: 4/29/2024 4:28 PM                  Discharge Note Completed by: Mariel Herr RN    Patient Name: Kris Elizalde   YOB: 1980  Diagnosis: Right leg numbness [R20.0]  Left leg numbness [R20.0]  Radiculopathy, unspecified spinal region [M54.10]   Date / Time: 4/25/2024  3:52 PM    Current PCP: No primary care provider on file.  Clinic patient: No    Hospitalization in the last 30 days: No       Advance Directives:  Code Status: Full Code  [place] DNR form completed and on chart: No    Financial:  Payor: BCBS / Plan: BCBS OUT OF STATE / Product Type: *No Product type* /      Pharmacy:    [id number] [place], [place] - [address] - P [phone] - F [phone] [address]  [facility] [address]  Phone: [phone] Fax: [phone]    [id number] - [place], [place] - [address] - P [phone] - F [phone]  [address]  [facility] [address]  Phone: [phone] Fax: [phone]      Assistance purchasing medications?: Potential Assistance Purchasing Medications: No  Assistance provided by Case Management: None at this time    Does patient want to participate in local refill/ meds to beds program?:      Meds To Beds General Rules:  1. Can ONLY be done Monday- Friday between 8:30am-5pm  2. Prescription(s) must be in pharmacy by 3pm to be filled same day  3.Copy of patient's insurance/ prescription drug card and patient face sheet must be sent along with the prescription(s)  4. Cost of Rx cannot be added to hospital bill. If financial assistance is needed, please contact unit  or ;  or  CANNOT provide pharmacy voucher for patients co-pays  5. Patients can then  the prescription on their way out of the hospital at discharge, or pharmacy can deliver to the bedside if staff is

## 2024-04-30 ENCOUNTER — CLINICAL DOCUMENTATION (OUTPATIENT)
Dept: ORTHOPEDIC SURGERY | Age: 44
End: 2024-04-30

## 2024-04-30 ENCOUNTER — HOSPITAL ENCOUNTER (OUTPATIENT)
Dept: PHYSICAL THERAPY | Age: 44
Setting detail: THERAPIES SERIES
Discharge: HOME | End: 2024-04-30
Payer: COMMERCIAL

## 2024-04-30 PROCEDURE — 97110 THERAPEUTIC EXERCISES: CPT | Performed by: PHYSICAL THERAPY ASSISTANT

## 2024-04-30 NOTE — FLOWSHEET NOTE
[facility]- Outpatient [facility] Therapy [address], [place] [address] office: [phone] fax: [phone]      Physical Therapy: TREATMENT/PROGRESS NOTE   Patient: Kris Elizalde (43 y.o. female)   Treatment Date: 2024   :  1980 MRN: [id number]   Visit #: 38   Insurance Allowable Auth Needed   25 []Yes    [x]No    Insurance: Payor: BCBS / Plan: BCBS OUT OF STATE / Product Type: *No Product type* / $0 copay/25 vpcy/no auth  Insurance ID: [id number] - ([place] [employer])  Secondary Insurance (if applicable):    Treatment Diagnosis:   1. Right knee pain, unspecified chronicity  M25.561         2. Arthrofibrosis of knee joint, right  M24.661         3. Edema of knee  M25.469         4. Gait abnormality  R26.9         5. 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                             Plan of care signed (Y/N):     Date of Patient follow up with Physician:      Progress Report/POC: NO  POC update due: 2024 (OR 10 visits /OR AUTH LIMITS, whichever is less)    Patient seen in consultation with Dr. Shipley who established the initial/subsequent treatment protocol.   24: seen by Frank- d/c gabapentin secondary to nausea/dizziness.  Begin MDP.  Contact Dr Rivera for early consult.  24: ok to remove bandage and sutures, will monitor swelling  24: seen by Dr Shipley-begin 10mg prednisone. Pt seeing Dr Rivera on Thursday.  Continue focus on increased flexion ROM.  Consider SAURABH in 2 weeks if ROM is not progressing well.  24: seen by Frank-planning to schedule SAURABH for next week.  Pt to be placed on a lower dose of Celebrex.  24: seen by Dr Shipley-extensive adhesions were noted along the medial border of the patella.  Able to reach 90 degrees of flexion with

## 2024-04-30 NOTE — PROGRESS NOTES
Both Dr. Pablo Shipley and myself spoke with the patient today at approximately 1415.  The patient feels that the numbness and paresthesias in her right lower extremity is slowly improving.  In addition she feels that her leg feels less heavy today and there is an increase in her quadricep contraction.  The patient was also able to achieve 101 degrees of flexion in physical therapy.  The patient is planning to go home tomorrow and is appropriate set up with transportation as well [place] airports.  We verified that she will have the services while [place] [facility] and her final flight home.    We discussed the patient's further treatment moving forward.  She has an appointment with her neurologist in 4 weeks.  She also has close follow-up with her rheumatologist to further discuss medication management.  We will continue to follow this patient and she has our contact information should she have any questions or concerns.  Will do tentative follow-up in 4 weeks and as needed.  Assuming that her neurological conditions resolved she may benefit from another PRP injection as she has noticed some reduction in swelling, pain and increased ability to gain further flexion.    All the patient's questions were answered and she expresses understanding of all of our answers.  She agrees with this plan.        Frank Connell PA-C    Physician Assistant - Certified  Orthopedic Surgery   [facility]    04/30/24 2:33 PM

## 2024-05-08 ENCOUNTER — TELEPHONE (OUTPATIENT)
Dept: ORTHOPEDIC SURGERY | Age: 44
End: 2024-05-08

## 2024-05-08 NOTE — TELEPHONE ENCOUNTER
Notified [place] office regarding the records ([facility] - 4/25/24 to date) to be mailed.    The [place] [id number] belongs to [place], [place]

## 2024-05-21 ENCOUNTER — TELEPHONE (OUTPATIENT)
Dept: ORTHOPEDIC SURGERY | Age: 44
End: 2024-05-21

## 2024-05-21 NOTE — TELEPHONE ENCOUNTER
Faxed medical records ([facility]) for 10/2023 to date to:    Hay Wang MD @ [phone]    Kwaku Jc MD @ [phone]    Vinny Monroe MD @ [phone]

## 2024-07-03 ENCOUNTER — CLINICAL DOCUMENTATION (OUTPATIENT)
Dept: ORTHOPEDIC SURGERY | Age: 44
End: 2024-07-03

## 2024-07-03 NOTE — PROGRESS NOTES
Date:  7/3/2024    Name:  Kris Elizalde  Address:  [address] [place] [id number]    :  1980      Age:   44 y.o.        Kris Elizalde is a 44 y.o. female who we have been treating for arthrofibrosis of the right knee.  She is since returned home to [place] where she is being followed by infectious disease.  Her infectious disease specialist has done a thorough workup and analysis.  Her labs have been normal and there is currently no signs or symptoms of infection.  We are still waiting to see if she improves with utilization of antibiotics.  Patient is being followed by rheumatology and is currently stable with those medications.  We encouraged the patient to utilize electrical stimulation for improved quadricep contraction.  The patient is getting an EMG and being followed by neurology to workup the spontaneous quadricep shutdown.  Patient is also been seen by another orthopedist for follow-up in [place], Dr. Declan Monroe DO.  This physician feels that there is little to no evidence suggesting prosthetic joint infection and he does not recommend a revision at this time.  Dr. Pablo Shipley MD is currently working with several physicians in [place] as well as [place] and [place] to consult with them, on possible future treatment courses for Kris Elizalde.  This includes the possibility of a revision.  We have been in close contact with this patient through various modes of communication to relay the treatment course, discussed results, and answer questions.        Frank Connell PA-C    Physician Assistant - Certified  Orthopedic Surgery   [facility]    24 8:23 AM      This dictation was performed with a verbal recognition program (DRAGON) and it was checked for errors.  It is possible that there are still dictated errors within this office note.  If so, please bring any errors to my attention for an addendum.  All efforts were made to ensure that this office note is

## 2024-08-13 DIAGNOSIS — M24.661 ARTHROFIBROSIS OF KNEE JOINT, RIGHT: Primary | ICD-10-CM

## 2024-08-13 DIAGNOSIS — M25.561 CHRONIC PAIN OF RIGHT KNEE: ICD-10-CM

## 2024-08-13 DIAGNOSIS — G89.29 CHRONIC PAIN OF RIGHT KNEE: ICD-10-CM

## 2024-10-02 ENCOUNTER — TELEPHONE (OUTPATIENT)
Dept: ORTHOPEDIC SURGERY | Age: 44
End: 2024-10-02

## 2024-10-02 NOTE — TELEPHONE ENCOUNTER
Spoke with patient and answered questions about her bill.      Sending message to Lilibeth Blackwood to see if she can offer some help.

## 2024-10-03 ENCOUNTER — TELEPHONE (OUTPATIENT)
Dept: ORTHOPEDIC SURGERY | Age: 44
End: 2024-10-03

## 2024-10-03 NOTE — TELEPHONE ENCOUNTER
Spoke with patient in regards to her financial aid.  Lilibeth Blackwood got back to me and said that they will update her account and send out a new bill.  I let her know this information.

## 2024-10-28 ENCOUNTER — TELEPHONE (OUTPATIENT)
Dept: ORTHOPEDIC SURGERY | Age: 44
End: 2024-10-28

## (undated) DEVICE — SOLUTION IRRIG 3000ML LAC R FLX CONT

## (undated) DEVICE — LOWER EXTREMITY: Brand: MEDLINE INDUSTRIES, INC.

## (undated) DEVICE — BLADE,CARBON-STEEL,10,STRL,DISPOSABLE,TB: Brand: MEDLINE

## (undated) DEVICE — COVER LT HNDL BLU PLAS

## (undated) DEVICE — GLOVE SURG SZ 7 L12IN FNGR THK79MIL GRN LTX FREE

## (undated) DEVICE — UNDERGLOVE SURG SZ 8.5 FNGR THK0.21MIL GRN LTX BEAD CUF

## (undated) DEVICE — APPLICATOR MEDICATED 26 CC SOLUTION HI LT ORNG CHLORAPREP

## (undated) DEVICE — TOWEL,STOP FLAG GOLD N-W: Brand: MEDLINE

## (undated) DEVICE — SUTURE VCRL + SZ 0 L18IN ABSRB UD L36MM CT-1 1/2 CIR VCP840D

## (undated) DEVICE — GLOVE SURG SZ 7 CRM LTX FREE POLYISOPRENE POLYMER BEAD ANTI

## (undated) DEVICE — STANDARD HYPODERMIC NEEDLE,POLYPROPYLENE HUB: Brand: MONOJECT

## (undated) DEVICE — SUTURE VCRL + SZ 3-0 L18IN CT 1 CR ABSRB VCP838D

## (undated) DEVICE — SOLUTION IRRIG 3000ML 0.9% SOD CHL USP UROMATIC PLAS CONT

## (undated) DEVICE — KIT ANGEL PRP

## (undated) DEVICE — GOWN,SIRUS,POLYRNF,BRTHSLV,XL,30/CS: Brand: MEDLINE

## (undated) DEVICE — 3M™ IOBAN™ 2 ANTIMICROBIAL INCISE DRAPE 6648EZ: Brand: IOBAN™ 2

## (undated) DEVICE — SOLUTION IV 1000ML 0.9% SOD CHL

## (undated) DEVICE — FLUID TRAP FOR MINIVAC ES EQUIP FLD TRAP

## (undated) DEVICE — BLADE,CARBON-STEEL,15,STRL,DISPOSABLE,TB: Brand: MEDLINE

## (undated) DEVICE — SUTURE ETHBND EXCEL SZ 0 L18IN NONABSORBABLE GRN L26MM MO-6 CX45D

## (undated) DEVICE — UNDERGLOVE SURG SZ 9 FNGR THK0.21MIL GRN LTX BEAD CUF

## (undated) DEVICE — COUNTER NDL 40 COUNT HLD 70 NUM FOAM BLK SGL MAG W BLDE REMV

## (undated) DEVICE — STRIP,CLOSURE,WOUND,MEDI-STRIP,1/2X4: Brand: MEDLINE

## (undated) DEVICE — GOWN,SIRUS,POLYRNF,BRTHSLV,XLN/XXL,18/CS: Brand: MEDLINE

## (undated) DEVICE — SUTURE NONABSORBABLE MONOFILAMENT 4-0 PS-2 18 IN BLK ETHILON 1667G

## (undated) DEVICE — MENISCECTOMY ELECTRODE BASIC KIT; STANDARD DESIGN, SHORT: Brand: CONMED

## (undated) DEVICE — ELECTROSURGICAL PENCIL ROCKER SWITCH NON COATED BLADE ELECTRODE 10 FT (3 M) CORD HOLSTER: Brand: MEGADYNE

## (undated) DEVICE — SHEET,DRAPE,53X77,STERILE: Brand: MEDLINE

## (undated) DEVICE — PENCIL SMK EVAC TELSCP 3 M TBNG

## (undated) DEVICE — KNEE ARTHROSCOPY: Brand: MEDLINE INDUSTRIES, INC.

## (undated) DEVICE — JEWISH HOSPITAL TURNOVER KIT: Brand: MEDLINE INDUSTRIES, INC.

## (undated) DEVICE — HIGH FLOW TIP

## (undated) DEVICE — ELECTRODE PT RET AD L9FT HI MOIST COND ADH HYDRGEL CORDED

## (undated) DEVICE — TUBING FLD MGMT Y DBL SPIK DUALWAVE

## (undated) DEVICE — SIPS DUAL 2 MINUTE TIP

## (undated) DEVICE — PROBE ABLAT XL 90DEG ASPIR BPLR RF 1 PC ELECTRD ERGO HNDL

## (undated) DEVICE — COVER,MAYO STAND,XL,STERILE: Brand: MEDLINE

## (undated) DEVICE — 87K ARTHROSCOPY TUBING SET: Brand: 87K